# Patient Record
Sex: FEMALE | Race: BLACK OR AFRICAN AMERICAN | NOT HISPANIC OR LATINO | Employment: FULL TIME | URBAN - METROPOLITAN AREA
[De-identification: names, ages, dates, MRNs, and addresses within clinical notes are randomized per-mention and may not be internally consistent; named-entity substitution may affect disease eponyms.]

---

## 2017-06-21 ENCOUNTER — OFFICE VISIT (OUTPATIENT)
Dept: URGENT CARE | Facility: URGENT CARE | Age: 27
End: 2017-06-21
Payer: COMMERCIAL

## 2017-06-21 VITALS
BODY MASS INDEX: 37.51 KG/M2 | TEMPERATURE: 98.1 F | OXYGEN SATURATION: 98 % | HEIGHT: 67 IN | WEIGHT: 239 LBS | SYSTOLIC BLOOD PRESSURE: 112 MMHG | HEART RATE: 80 BPM | RESPIRATION RATE: 16 BRPM | DIASTOLIC BLOOD PRESSURE: 66 MMHG

## 2017-06-21 DIAGNOSIS — R53.81 MALAISE AND FATIGUE: Primary | ICD-10-CM

## 2017-06-21 DIAGNOSIS — R53.83 MALAISE AND FATIGUE: Primary | ICD-10-CM

## 2017-06-21 LAB
ANION GAP SERPL CALCULATED.3IONS-SCNC: 3 MMOL/L (ref 3–14)
BUN SERPL-MCNC: 11 MG/DL (ref 7–30)
CALCIUM SERPL-MCNC: 9.5 MG/DL (ref 8.5–10.1)
CHLORIDE SERPL-SCNC: 105 MMOL/L (ref 94–109)
CO2 SERPL-SCNC: 28 MMOL/L (ref 20–32)
CREAT SERPL-MCNC: 0.8 MG/DL (ref 0.52–1.04)
ERYTHROCYTE [DISTWIDTH] IN BLOOD BY AUTOMATED COUNT: 12.6 % (ref 10–15)
GFR SERPL CREATININE-BSD FRML MDRD: 85 ML/MIN/1.7M2
GLUCOSE SERPL-MCNC: 106 MG/DL (ref 70–99)
HCT VFR BLD AUTO: 41.1 % (ref 35–47)
HGB BLD-MCNC: 13.3 G/DL (ref 11.7–15.7)
MCH RBC QN AUTO: 30.6 PG (ref 26.5–33)
MCHC RBC AUTO-ENTMCNC: 32.4 G/DL (ref 31.5–36.5)
MCV RBC AUTO: 95 FL (ref 78–100)
PLATELET # BLD AUTO: 221 10E9/L (ref 150–450)
POTASSIUM SERPL-SCNC: 4.1 MMOL/L (ref 3.4–5.3)
RBC # BLD AUTO: 4.35 10E12/L (ref 3.8–5.2)
SODIUM SERPL-SCNC: 136 MMOL/L (ref 133–144)
WBC # BLD AUTO: 8.4 10E9/L (ref 4–11)

## 2017-06-21 PROCEDURE — 85027 COMPLETE CBC AUTOMATED: CPT | Performed by: HOSPITALIST

## 2017-06-21 PROCEDURE — 99214 OFFICE O/P EST MOD 30 MIN: CPT | Performed by: HOSPITALIST

## 2017-06-21 PROCEDURE — 93000 ELECTROCARDIOGRAM COMPLETE: CPT | Performed by: HOSPITALIST

## 2017-06-21 PROCEDURE — 36415 COLL VENOUS BLD VENIPUNCTURE: CPT | Performed by: HOSPITALIST

## 2017-06-21 PROCEDURE — 80048 BASIC METABOLIC PNL TOTAL CA: CPT | Performed by: HOSPITALIST

## 2017-06-21 NOTE — NURSING NOTE
"Chief Complaint   Patient presents with     Fatigue     tired, chest pain on and off x 2 months, was after her        Initial /66 (BP Location: Right arm, Patient Position: Sitting, Cuff Size: Adult Large)  Pulse 80  Temp 98.1  F (36.7  C) (Oral)  Resp 16  Ht 5' 7\" (1.702 m)  Wt 239 lb (108.4 kg)  LMP   SpO2 98%  Breastfeeding? No  BMI 37.43 kg/m2 Estimated body mass index is 37.43 kg/(m^2) as calculated from the following:    Height as of this encounter: 5' 7\" (1.702 m).    Weight as of this encounter: 239 lb (108.4 kg).  Medication Reconciliation: elkin Nguyen CMA      "

## 2017-06-21 NOTE — PROGRESS NOTES
"  Patient came here due to fatique and chest discomfort that has been going on and off for about 1 year. She has her c section for her first pregnancy last July 2016 apparently after that pt feel easily tired and also has chest discomfort on and off, due to that pt actually has echo last year and it only show mild pulmonary hypertension and also trace mitral regurgitation. She unfortunately has not ever seen any primary care, she also has Mirena placed about 6 weeks post partum for birth control. Her work is a  as per her it is not very taxing. 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     No Known Allergies    History reviewed. No pertinent past medical history.      No current outpatient prescriptions on file prior to visit.  No current facility-administered medications on file prior to visit.     Social History   Substance Use Topics     Smoking status: Never Smoker     Smokeless tobacco: Never Used     Alcohol use Yes       ROS:  Consitutional: As above  ENT: As above  Respiratory: As above    OBJECTIVE:  /66 (BP Location: Right arm, Patient Position: Sitting, Cuff Size: Adult Large)  Pulse 80  Temp 98.1  F (36.7  C) (Oral)  Resp 16  Ht 5' 7\" (1.702 m)  Wt 239 lb (108.4 kg)  LMP   SpO2 98%  Breastfeeding? No  BMI 37.43 kg/m2  GENERAL APPEARANCE: healthy, alert and mild distress  EYES: conjunctiva clear  EARS:no cerumen.   Ear canals w/o erythema, TM's intact w/o erythema.    NOSE/MOUTH: Nose and mouth without ulcers, erythema or lesions  THROAT: no erythema w/ no tonsillar enlargement . no exudates  NECK: supple, nontender, no lymphadenopathy, normal thyroid gland size  RESP: lungs clear to auscultation - no rales, rhonchi or wheezes  CV: regular rates and rhythm, normal S1 S2, no murmur noted  NEURO: awake, alert      EKG: normal sinus rhythm    Recent Results (from the past 168 hour(s))   CBC with platelets    Collection Time: 06/21/17  5:25 PM   Result Value Ref Range    WBC 8.4 4.0 - " 11.0 10e9/L    RBC Count 4.35 3.8 - 5.2 10e12/L    Hemoglobin 13.3 11.7 - 15.7 g/dL    Hematocrit 41.1 35.0 - 47.0 %    MCV 95 78 - 100 fl    MCH 30.6 26.5 - 33.0 pg    MCHC 32.4 31.5 - 36.5 g/dL    RDW 12.6 10.0 - 15.0 %    Platelet Count 221 150 - 450 10e9/L        ASSESSMENT:     ICD-10-CM    1. Malaise and fatigue R53.81 CBC with platelets    R53.83 Basic metabolic panel  (Ca, Cl, CO2, Creat, Gluc, K, Na, BUN)     EKG 12-lead complete w/read - Clinics         PLAN:    At this time i do stress to her the neccessitiy to establish to a primary care, I explain fatique can be very broad from cardiac to hormonal issue,    When I got the result of her EKG and bmp and cbc patient already left the clinic. her initial EKG was normal her cbc and bmp also normal. I do left message on her cell phone ([phone]) to notify her about the result     I do think she need to evaluate further on primary care clinic, she need at the very least a thyroid evaluation too. (this was discuss before patient left our office)     Patient understand and agreeable with this plan, all question answered.     Viji Walter MD

## 2017-06-21 NOTE — MR AVS SNAPSHOT
"              After Visit Summary   2017    Isabell Maza    MRN: [id number]           Patient Information     Date Of Birth          1990        Visit Information        Provider Department      2017 3:00 PM Viji Walter MD [facility]        Today's Diagnoses     Malaise and fatigue    -  1       Follow-ups after your visit        Who to contact     If you have questions or need follow up information about today's clinic visit or your schedule please contact [facility] directly at [phone].  Normal or non-critical lab and imaging results will be communicated to you by [facility], letter or phone within 4 business days after the clinic has received the results. If you do not hear from us within 7 days, please contact the clinic through [facility] or phone. If you have a critical or abnormal lab result, we will notify you by phone as soon as possible.  Submit refill requests through [employer] or call your pharmacy and they will forward the refill request to us. Please allow 3 business days for your refill to be completed.          Additional Information About Your Visit        MyChart Information     [employer] lets you send messages to your doctor, view your test results, renew your prescriptions, schedule appointments and more. To sign up, go to [email or web address] . Click on \"Log in\" on the left side of the screen, which will take you to the Welcome page. Then click on \"Sign up Now\" on the right side of the page.     You will be asked to enter the access code listed below, as well as some personal information. Please follow the directions to create your username and password.     Your access code is: [email or web address]  Expires: 2017  6:15 PM     Your access code will  in 90 days. If you need help or a new code, please call your [place] clinic or [phone].        Care EveryWhere ID     This is your Care EveryWhere ID. This could be used by other " "organizations to access your [facility] medical records  [id number]        Your Vitals Were     Pulse Temperature Respirations Height Pulse Oximetry       80 98.1  F (36.7  C) (Oral) 16 5' 7\" (1.702 m) 98%     Breastfeeding? BMI (Body Mass Index)                No 37.43 kg/m2           Blood Pressure from Last 3 Encounters:   06/21/17 112/66   02/20/13 100/74   03/23/10 112/82    Weight from Last 3 Encounters:   06/21/17 239 lb (108.4 kg)   08/28/07 183 lb 6.4 oz (83.2 kg) (95 %)*     * Growth percentiles are based on CDC 2-20 Years data.              We Performed the Following     Basic metabolic panel  (Ca, Cl, CO2, Creat, Gluc, K, Na, BUN)     CBC with platelets     EKG 12-lead complete w/read - Clinics        Primary Care Provider    None Specified       No primary provider on file.        Equal Access to Services     [facility]: Hadii wesley Conde, wajenna wells, karl kaalmada jama, ralph mensah . So [facility] [phone].    ATENCIÓN: Si habla español, tiene a carrizales disposición servicios gratuitos de asistencia lingüística. Steveame al [phone].    We comply with applicable federal civil rights laws and [place] laws. We do not discriminate on the basis of race, color, national origin, age, disability sex, sexual orientation or gender identity.            Thank you!     Thank you for choosing [facility]  for your care. Our goal is always to provide you with excellent care. Hearing back from our patients is one way we can continue to improve our services. Please take a few minutes to complete the written survey that you may receive in the mail after your visit with us. Thank you!             Your Updated Medication List - Protect others around you: Learn how to safely use, store and throw away your medicines at www.disposemymeds.org.      Notice  As of 6/21/2017  6:15 PM    You have not been prescribed any medications.      "

## 2017-07-27 ENCOUNTER — OFFICE VISIT (OUTPATIENT)
Dept: PEDIATRICS | Facility: CLINIC | Age: 27
End: 2017-07-27
Payer: COMMERCIAL

## 2017-07-27 VITALS
HEART RATE: 62 BPM | WEIGHT: 238.3 LBS | SYSTOLIC BLOOD PRESSURE: 104 MMHG | BODY MASS INDEX: 37.4 KG/M2 | OXYGEN SATURATION: 98 % | DIASTOLIC BLOOD PRESSURE: 62 MMHG | HEIGHT: 67 IN | TEMPERATURE: 97.9 F

## 2017-07-27 DIAGNOSIS — F41.9 ANXIETY: ICD-10-CM

## 2017-07-27 DIAGNOSIS — R53.83 OTHER FATIGUE: Primary | ICD-10-CM

## 2017-07-27 PROCEDURE — 99214 OFFICE O/P EST MOD 30 MIN: CPT | Performed by: NURSE PRACTITIONER

## 2017-07-27 ASSESSMENT — ANXIETY QUESTIONNAIRES
3. WORRYING TOO MUCH ABOUT DIFFERENT THINGS: NEARLY EVERY DAY
IF YOU CHECKED OFF ANY PROBLEMS ON THIS QUESTIONNAIRE, HOW DIFFICULT HAVE THESE PROBLEMS MADE IT FOR YOU TO DO YOUR WORK, TAKE CARE OF THINGS AT HOME, OR GET ALONG WITH OTHER PEOPLE: VERY DIFFICULT
5. BEING SO RESTLESS THAT IT IS HARD TO SIT STILL: SEVERAL DAYS
2. NOT BEING ABLE TO STOP OR CONTROL WORRYING: NEARLY EVERY DAY
GAD7 TOTAL SCORE: 14
6. BECOMING EASILY ANNOYED OR IRRITABLE: SEVERAL DAYS
1. FEELING NERVOUS, ANXIOUS, OR ON EDGE: MORE THAN HALF THE DAYS
7. FEELING AFRAID AS IF SOMETHING AWFUL MIGHT HAPPEN: MORE THAN HALF THE DAYS

## 2017-07-27 ASSESSMENT — PATIENT HEALTH QUESTIONNAIRE - PHQ9: 5. POOR APPETITE OR OVEREATING: MORE THAN HALF THE DAYS

## 2017-07-27 NOTE — NURSING NOTE
"Chief Complaint   Patient presents with     RECHECK     UC F/U       Initial /62 (Cuff Size: Adult Large)  Pulse 62  Temp 97.9  F (36.6  C) (Tympanic)  Ht 5' 7\" (1.702 m)  Wt 238 lb 4.8 oz (108.1 kg)  SpO2 98%  BMI 37.32 kg/m2 Estimated body mass index is 37.32 kg/(m^2) as calculated from the following:    Height as of this encounter: 5' 7\" (1.702 m).    Weight as of this encounter: 238 lb 4.8 oz (108.1 kg).  Medication Reconciliation: complete   Bobbi Ugalde CMA    "

## 2017-07-27 NOTE — PATIENT INSTRUCTIONS
-Decrease caffeine somewhat  -Start Prozac 20mg daily  -See therapist  -Sleep study [facility] [place]  Ph [phone] (Age 18 and up)

## 2017-07-27 NOTE — MR AVS SNAPSHOT
After Visit Summary   7/27/2017    Isabell Maza    MRN: [id number]           Patient Information     Date Of Birth          1990        Visit Information        Provider Department      7/27/2017 1:20 PM Raven Porter APRN [facility]        Today's Diagnoses     Other fatigue    -  1    Anxiety          Care Instructions    -Decrease caffeine somewhat  -Start Prozac 20mg daily  -See therapist  -Sleep study [facility] [phone] (Age 18 and up)            Follow-ups after your visit        Additional Services     MENTAL HEALTH REFERRAL       Your provider has referred you to: [facility]: [facility] - Counseling (Individual/Couples/Family) - [facility] [place] [phone]   *Patient will be contacted by [facility]'s scheduling partner, [facility]), to schedule an appointment.  Patients may also call [facility] to schedule.    All scheduling is subject to the client's specific insurance plan & benefits, provider/location availability, and provider clinical specialities.  Please arrive 15 minutes early for your first appointment and bring your completed paperwork.    Please be aware that coverage of these services is subject to the terms and limitations of your health insurance plan.  Call member services at your health plan with any benefit or coverage questions.            SLEEP EVALUATION & MANAGEMENT REFERRAL - ADULT       Please be aware that coverage of these services is subject to the terms and limitations of your health insurance plan.  Call member services at your health plan with any benefit or coverage questions.      Please bring the following to your appointment:    >>   List of current medications   >>   This referral request   >>   Any documents/labs given to you for this referral    [facility] [phone] (Age 18 and up)                  Future tests that were  "ordered for you today     Open Future Orders        Priority Expected Expires Ordered    SLEEP EVALUATION & MANAGEMENT REFERRAL - ADULT Routine  2018            Who to contact     If you have questions or need follow up information about today's clinic visit or your schedule please contact [facility] [place] directly at [phone].  Normal or non-critical lab and imaging results will be communicated to you by MyChart, letter or phone within 4 business days after the clinic has received the results. If you do not hear from us within 7 days, please contact the clinic through [employer] or phone. If you have a critical or abnormal lab result, we will notify you by phone as soon as possible.  Submit refill requests through [employer] or call your pharmacy and they will forward the refill request to us. Please allow 3 business days for your refill to be completed.          Additional Information About Your Visit        [employer] Information     [employer] lets you send messages to your doctor, view your test results, renew your prescriptions, schedule appointments and more. To sign up, go to [email or web address] . Click on \"Log in\" on the left side of the screen, which will take you to the Welcome page. Then click on \"Sign up Now\" on the right side of the page.     You will be asked to enter the access code listed below, as well as some personal information. Please follow the directions to create your username and password.     Your access code is: [email or web address]  Expires: 2017  6:15 PM     Your access code will  in 90 days. If you need help or a new code, please call your [place] clinic or [phone].        Care EveryWhere ID     This is your Care EveryWhere ID. This could be used by other organizations to access your [facility] medical records  [id number]        Your Vitals Were     Pulse Temperature Height Pulse Oximetry BMI (Body Mass Index)       62 97.9  F (36.6  C) (Tympanic) 5' 7\" (1.702 " m) 98% 37.32 kg/m2        Blood Pressure from Last 3 Encounters:   07/27/17 104/62   06/21/17 112/66   02/20/13 100/74    Weight from Last 3 Encounters:   07/27/17 238 lb 4.8 oz (108.1 kg)   06/21/17 239 lb (108.4 kg)   08/28/07 183 lb 6.4 oz (83.2 kg) (95 %)*     * Growth percentiles are based on [facility] 2-20 Years data.              We Performed the Following     MENTAL HEALTH REFERRAL     TSH with free T4 reflex     Vitamin D Deficiency          Today's Medication Changes          These changes are accurate as of: 7/27/17  2:02 PM.  If you have any questions, ask your nurse or doctor.               Start taking these medicines.        Dose/Directions    FLUoxetine 20 MG capsule   Commonly known as:  PROzac   Used for:  Anxiety   Started by:  Raven Porter APRN CNP        Dose:  20 mg   Take 1 capsule (20 mg) by mouth daily   Quantity:  30 capsule   Refills:  1            Where to get your medicines      These medications were sent to [id number] - [place], [place] - [address]  AT [address]  [address] [place] [address]     Phone:  [phone]     FLUoxetine 20 MG capsule                Primary Care Provider    None Specified       No primary provider on file.        Equal Access to Services     [facility] AH: Sebastian vierao Soamy, waaxda luqadaha, qaybta kaalmada adeegyada, ralph fernandez. So [facility] [phone].    ATENCIÓN: Si habla español, tiene a carrizales disposición servicios gratuitos de asistencia lingüística. Bacilio al [phone].    We comply with applicable federal civil rights laws and [place] laws. We do not discriminate on the basis of race, color, national origin, age, disability sex, sexual orientation or gender identity.            Thank you!     Thank you for choosing [facility]  for your care. Our goal is always to provide you with excellent care. Hearing back from our patients is one way we can  continue to improve our services. Please take a few minutes to complete the written survey that you may receive in the mail after your visit with us. Thank you!             Your Updated Medication List - Protect others around you: Learn how to safely use, store and throw away your medicines at www.disposemymeds.org.          This list is accurate as of: 7/27/17  2:02 PM.  Always use your most recent med list.                   Brand Name Dispense Instructions for use Diagnosis    FLUoxetine 20 MG capsule    PROzac    30 capsule    Take 1 capsule (20 mg) by mouth daily    Anxiety
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
## 2017-07-28 ASSESSMENT — ANXIETY QUESTIONNAIRES: GAD7 TOTAL SCORE: 14

## 2017-07-28 ASSESSMENT — PATIENT HEALTH QUESTIONNAIRE - PHQ9: SUM OF ALL RESPONSES TO PHQ QUESTIONS 1-9: 10

## 2017-08-29 ENCOUNTER — HOSPITAL ENCOUNTER (EMERGENCY)
Facility: CLINIC | Age: 27
Discharge: HOME | End: 2017-08-29
Attending: EMERGENCY MEDICINE | Admitting: EMERGENCY MEDICINE
Payer: COMMERCIAL

## 2017-08-29 VITALS
OXYGEN SATURATION: 96 % | SYSTOLIC BLOOD PRESSURE: 126 MMHG | HEART RATE: 87 BPM | HEIGHT: 67 IN | RESPIRATION RATE: 18 BRPM | DIASTOLIC BLOOD PRESSURE: 84 MMHG | BODY MASS INDEX: 37.04 KG/M2 | TEMPERATURE: 97.8 F | WEIGHT: 236 LBS

## 2017-08-29 DIAGNOSIS — R55 VASOVAGAL SYNCOPE: ICD-10-CM

## 2017-08-29 DIAGNOSIS — R55 NEAR SYNCOPE: ICD-10-CM

## 2017-08-29 LAB
ANION GAP SERPL CALCULATED.3IONS-SCNC: 8 MMOL/L (ref 3–14)
BASOPHILS # BLD AUTO: 0 10E9/L (ref 0–0.2)
BASOPHILS NFR BLD AUTO: 0.3 %
BUN SERPL-MCNC: 17 MG/DL (ref 7–30)
CALCIUM SERPL-MCNC: 8.5 MG/DL (ref 8.5–10.1)
CHLORIDE SERPL-SCNC: 107 MMOL/L (ref 94–109)
CO2 SERPL-SCNC: 23 MMOL/L (ref 20–32)
CREAT SERPL-MCNC: 0.8 MG/DL (ref 0.52–1.04)
DIFFERENTIAL METHOD BLD: ABNORMAL
EOSINOPHIL # BLD AUTO: 0.1 10E9/L (ref 0–0.7)
EOSINOPHIL NFR BLD AUTO: 0.5 %
ERYTHROCYTE [DISTWIDTH] IN BLOOD BY AUTOMATED COUNT: 12.4 % (ref 10–15)
GFR SERPL CREATININE-BSD FRML MDRD: 85 ML/MIN/1.7M2
GLUCOSE SERPL-MCNC: 87 MG/DL (ref 70–99)
HCG UR QL: NEGATIVE
HCT VFR BLD AUTO: 38.8 % (ref 35–47)
HGB BLD-MCNC: 12.6 G/DL (ref 11.7–15.7)
IMM GRANULOCYTES # BLD: 0.1 10E9/L (ref 0–0.4)
IMM GRANULOCYTES NFR BLD: 0.4 %
LYMPHOCYTES # BLD AUTO: 1.7 10E9/L (ref 0.8–5.3)
LYMPHOCYTES NFR BLD AUTO: 14.5 %
MCH RBC QN AUTO: 30.2 PG (ref 26.5–33)
MCHC RBC AUTO-ENTMCNC: 32.5 G/DL (ref 31.5–36.5)
MCV RBC AUTO: 93 FL (ref 78–100)
MONOCYTES # BLD AUTO: 0.8 10E9/L (ref 0–1.3)
MONOCYTES NFR BLD AUTO: 6.3 %
NEUTROPHILS # BLD AUTO: 9.3 10E9/L (ref 1.6–8.3)
NEUTROPHILS NFR BLD AUTO: 78 %
NRBC # BLD AUTO: 0 10*3/UL
NRBC BLD AUTO-RTO: 0 /100
PLATELET # BLD AUTO: 223 10E9/L (ref 150–450)
POTASSIUM SERPL-SCNC: 3.8 MMOL/L (ref 3.4–5.3)
RBC # BLD AUTO: 4.17 10E12/L (ref 3.8–5.2)
SODIUM SERPL-SCNC: 138 MMOL/L (ref 133–144)
TROPONIN I SERPL-MCNC: <0.015 UG/L (ref 0–0.04)
WBC # BLD AUTO: 12 10E9/L (ref 4–11)

## 2017-08-29 PROCEDURE — 93005 ELECTROCARDIOGRAM TRACING: CPT

## 2017-08-29 PROCEDURE — 85025 COMPLETE CBC W/AUTO DIFF WBC: CPT | Performed by: EMERGENCY MEDICINE

## 2017-08-29 PROCEDURE — 96361 HYDRATE IV INFUSION ADD-ON: CPT

## 2017-08-29 PROCEDURE — 84484 ASSAY OF TROPONIN QUANT: CPT | Performed by: EMERGENCY MEDICINE

## 2017-08-29 PROCEDURE — 36415 COLL VENOUS BLD VENIPUNCTURE: CPT | Performed by: EMERGENCY MEDICINE

## 2017-08-29 PROCEDURE — 96360 HYDRATION IV INFUSION INIT: CPT

## 2017-08-29 PROCEDURE — 25000128 H RX IP 250 OP 636: Performed by: EMERGENCY MEDICINE

## 2017-08-29 PROCEDURE — 80048 BASIC METABOLIC PNL TOTAL CA: CPT | Performed by: EMERGENCY MEDICINE

## 2017-08-29 PROCEDURE — 81025 URINE PREGNANCY TEST: CPT | Performed by: EMERGENCY MEDICINE

## 2017-08-29 PROCEDURE — 99284 EMERGENCY DEPT VISIT MOD MDM: CPT | Mod: 25

## 2017-08-29 RX ADMIN — SODIUM CHLORIDE 1000 ML: 9 INJECTION, SOLUTION INTRAVENOUS at 19:45

## 2017-08-29 ASSESSMENT — ENCOUNTER SYMPTOMS
LIGHT-HEADEDNESS: 1
DIAPHORESIS: 1
SHORTNESS OF BREATH: 1

## 2017-08-29 NOTE — ED AVS SNAPSHOT
[facility] Emergency Department    [address] [place] [address]    Phone:  [phone]    Fax:  [phone]                                       Isabell Maza   MRN: [id number]    Department:  [facility] Emergency Department   Date of Visit:  8/29/2017           Patient Information     Date Of Birth          1990        Your diagnoses for this visit were:     Near syncope     Vasovagal syncope        You were seen by Katalina Peck MD.      Follow-up Information     Follow up with Teddy Iraheta. Schedule an appointment as soon as possible for a visit in 1 day.        Discharge Instructions       Please return to the emergency department if you notice any lightheadedness, shortness of breath, chest pain, numbness or tingling, difficulty walking, severe headache or other acute changes.  Please follow-up with your primary care doctor in the next 1-2 days.    Discharge Instructions  Syncope    Syncope (fainting) is a sudden, short loss of consciousness (passing out spell). People will usually fall to the ground when they faint or slump over if seated.  People may also shake when this happens, and it can sometimes be difficult to tell the difference between syncope and a seizure. At this time, your provider does not find a reason to suspect that your fainting spell is a sign of anything dangerous or life-threatening.  However, sometimes the signs of serious illness do not show up right away.     Generally, every Emergency Department visit should have a follow-up clinic visit with either a primary or a specialty clinic/provider. Please follow-up as instructed by your emergency provider today.    Return to the Emergency Department if:    You faint again.     You have any significant bleeding.    You have chest pain or a fast or irregular heartbeat.    You feel short of breath.    You cough up any blood.    You have abdominal (belly) pain or unusual  back pain.    You have ongoing vomiting (throwing up) or diarrhea (loose stools).    You have a black or tarry bowel movement, or blood in the stool or in your vomit.    You have a fever over 101 F.    You lose feeling or cannot move a part of your body or cannot talk normally.    You are confused, have a headache, cannot see well, or have a seizure.    DO NOT DRIVE. CALL 911 INSTEAD!    What can I do to help myself?    Follow any specific instructions that your provider discussed with you.    If you feel light-headed, make sure to sit down right away, even if you have to sit on the floor.    Follow up with your regular medical provider as discussed for further management. This may include lowering your blood pressure medications, insulin or other diabetic medications, checking your blood sugar more frequently, and drinking more fluids, taking medicines for vomiting or diarrhea or getting up slower.  If you were given a prescription for medicine here today, be sure to read all of the information (including the package insert) that comes with your prescription.  This will include important information about the medicine, its side effects, and any warnings that you need to know about.  The pharmacist who fills the prescription can provide more information and answer questions you may have about the medicine.  If you have questions or concerns that the pharmacist cannot address, please call or return to the Emergency Department.   Remember that you can always come back to the Emergency Department if you are not able to see your regular provider in the amount of time listed above, if you get any new symptoms, or if there is anything that worries you.      Near-Fainting: Vagal Reaction  Fainting (syncope) is a temporary loss of consciousness (passing out). It is associated with a loss of postural tone. Postural tone is the constant contraction of the muscles in your body to help keep your body upright. It also helps  "blood return towards the heart and brain. Syncope occurs when there is reduced blood flow to the brain due to this common vagal reaction. A vagal reaction is a reflex response that causes a sudden drop in your blood pressure, and your pulse to slow down. If the pulse is low enough, the blood pressure falls and causes fainting or near-fainting. Lying down usually stops the reaction very quickly.  These are symptoms of near-fainting:    Feeling lightheaded or like you are going to faint    Weak pulse    Nausea    Sweating    Blurred vision or feeling like your vision is \"blacking out\"    Palpitations    Chest pain    Trouble breathing    Cool and clammy skin  Causes for near-fainting include:    Sudden emotional stress like fear, pain, panic, sight of blood    Straining or overexertion, straining while using the toilet, coughing, sneezing    Standing up too quickly, or standing up for too long a time    Pregnancy  Home care  The following will help you care for yourself at home:    Rest today and go back to your normal activities as soon as you are feeling back to normal.    If you become light-headed or dizzy, lie down right away or sit with your head lowered between your knees.    Stay hydrated and do not skip meals.    Avoid prolonged standing and hot places    Do what you can to prevent constipation. If you bear down excessively when trying to have a bowel movement, this can trigger a vagal response  There may be other causes for a vagal response and near-syncope. For example, this can happen after open-heart surgery when the heart muscle is inflamed and irritated.  Check with your doctor to see if there is testing you need such as a tilt-table test, heart rhythm monitoring, or blood tests. Review the medicines you take with your healthcare provider and pharmacist to be sure the symptoms you have are not a side effect of a medicine.  Follow-up care  Follow up with your healthcare provider, or as advised.   If you " are having frequent episodes of near-syncope or vagal reactions, be cautious about activities such as driving that could harm yourself or others if you were to faint. Do not drive or operate heavy machinery if you are feeling like you may faint.  Call 911  Call 911 if any of these occur:    Another fainting spell occurs, and it is not explained by the common causes listed above    Fainting or loss of consciousness    Chest, arm, neck, jaw, back, or abdominal pain    Shortness of breath    Weakness, tingling, or numbness in one side of the face, one arm or leg    Slurred speech, confusion, trouble walking or seeing    Seizure    Blood in vomit or stools (black or red color)  When to seek medical advice  Call your healthcare provider right away if you have occasional mild lightheadedness, especially when standing up.  Date Last Reviewed: 6/1/2016 2000-2017 [employer]. [address] [place] [address]. All rights reserved. This information is not intended as a substitute for professional medical care. Always follow your healthcare professional's instructions.          24 Hour Appointment Hotline       To make an appointment at any [facility], call [phone] ([phone]). If you don't have a family doctor or clinic, we will help you find one. [facility] clinics are conveniently located to serve the needs of you and your family.             Review of your medicines      Our records show that you are taking the medicines listed below. If these are incorrect, please call your family doctor or clinic.        Dose / Directions Last dose taken    FLUoxetine 20 MG capsule   Commonly known as:  PROzac   Dose:  20 mg   Quantity:  30 capsule        Take 1 capsule (20 mg) by mouth daily   Refills:  1                Procedures and tests performed during your visit     Basic metabolic panel    CBC with platelets differential    EKG 12-lead, tracing only    HCG qualitative urine    Troponin I       Orders Needing Specimen Collection     None      Pending Results     Date and Time Order Name Status Description    8/29/2017 1925 EKG 12-lead, tracing only Preliminary             Pending Culture Results     No orders found from 8/27/2017 to 8/30/2017.            Pending Results Instructions     If you had any lab results that were not finalized at the time of your Discharge, you can call the ED Lab Result RN at [phone]. You will be contacted by this team for any positive Lab results or changes in treatment. The nurses are available 7 days a week from 10A to 6:30P.  You can leave a message 24 hours per day and they will return your call.        Test Results From Your Hospital Stay        8/29/2017  8:29 PM      Component Results     Component Value Ref Range & Units Status    HCG Qual Urine Negative NEG^Negative Final    This test is for screening purposes.  Results should be interpreted along with   the clinical picture.  Confirmation testing is available if warranted by   ordering [id number], HCG Quantitative Pregnancy.           8/29/2017  8:52 PM      Component Results     Component Value Ref Range & Units Status    Sodium 138 133 - 144 mmol/L Final    Potassium 3.8 3.4 - 5.3 mmol/L Final    Chloride 107 94 - 109 mmol/L Final    Carbon Dioxide 23 20 - 32 mmol/L Final    Anion Gap 8 3 - 14 mmol/L Final    Glucose 87 70 - 99 mg/dL Final    Urea Nitrogen 17 7 - 30 mg/dL Final    Creatinine 0.80 0.52 - 1.04 mg/dL Final    GFR Estimate 85 >60 mL/min/1.7m2 Final    Non  GFR Calc    GFR Estimate If Black >90 >60 mL/min/1.7m2 Final    African American GFR Calc    Calcium 8.5 8.5 - 10.1 mg/dL Final         8/29/2017  8:34 PM      Component Results     Component Value Ref Range & Units Status    WBC 12.0 (H) 4.0 - 11.0 10e9/L Final    RBC Count 4.17 3.8 - 5.2 10e12/L Final    Hemoglobin 12.6 11.7 - 15.7 g/dL Final    Hematocrit 38.8 35.0 - 47.0 % Final    MCV 93 78 - 100 fl Final    MCH 30.2 26.5 -  33.0 pg Final    MCHC 32.5 31.5 - 36.5 g/dL Final    RDW 12.4 10.0 - 15.0 % Final    Platelet Count 223 150 - 450 10e9/L Final    Diff Method Automated Method  Final    % Neutrophils 78.0 % Final    % Lymphocytes 14.5 % Final    % Monocytes 6.3 % Final    % Eosinophils 0.5 % Final    % Basophils 0.3 % Final    % Immature Granulocytes 0.4 % Final    Nucleated RBCs 0 0 /100 Final    Absolute Neutrophil 9.3 (H) 1.6 - 8.3 10e9/L Final    Absolute Lymphocytes 1.7 0.8 - 5.3 10e9/L Final    Absolute Monocytes 0.8 0.0 - 1.3 10e9/L Final    Absolute Eosinophils 0.1 0.0 - 0.7 10e9/L Final    Absolute Basophils 0.0 0.0 - 0.2 10e9/L Final    Abs Immature Granulocytes 0.1 0 - 0.4 10e9/L Final    Absolute Nucleated RBC 0.0  Final         8/29/2017  8:52 PM      Component Results     Component Value Ref Range & Units Status    Troponin I ES <0.015 0.000 - 0.045 ug/L Final    The 99th percentile for upper reference range is 0.045 ug/L.  Troponin values   in the range of 0.045 - 0.120 ug/L may be associated with risks of adverse   clinical events.                  Clinical Quality Measure: Blood Pressure Screening     Your blood pressure was checked while you were in the emergency department today. The last reading we obtained was  BP: 126/84 . Please read the guidelines below about what these numbers mean and what you should do about them.  If your systolic blood pressure (the top number) is less than 120 and your diastolic blood pressure (the bottom number) is less than 80, then your blood pressure is normal. There is nothing more that you need to do about it.  If your systolic blood pressure (the top number) is 120-139 or your diastolic blood pressure (the bottom number) is 80-89, your blood pressure may be higher than it should be. You should have your blood pressure rechecked within a year by a primary care provider.  If your systolic blood pressure (the top number) is 140 or greater or your diastolic blood pressure (the bottom  "number) is 90 or greater, you may have high blood pressure. High blood pressure is treatable, but if left untreated over time it can put you at risk for heart attack, stroke, or kidney failure. You should have your blood pressure rechecked by a primary care provider within the next 4 weeks.  If your provider in the emergency department today gave you specific instructions to follow-up with your doctor or provider even sooner than that, you should follow that instruction and not wait for up to 4 weeks for your follow-up visit.        Thank you for choosing [facility]       Thank you for choosing [facility] for your care. Our goal is always to provide you with excellent care. Hearing back from our patients is one way we can continue to improve our services. Please take a few minutes to complete the written survey that you may receive in the mail after you visit with us. Thank you!        [facility] Information     [facility] lets you send messages to your doctor, view your test results, renew your prescriptions, schedule appointments and more. To sign up, go to [email or web address] . Click on \"Log in\" on the left side of the screen, which will take you to the Welcome page. Then click on \"Sign up Now\" on the right side of the page.     You will be asked to enter the access code listed below, as well as some personal information. Please follow the directions to create your username and password.     Your access code is: [email or web address]  Expires: 2017  6:15 PM     Your access code will  in 90 days. If you need help or a new code, please call your [facility] clinic or [phone].        Care EveryWhere ID     This is your Care EveryWhere ID. This could be used by other organizations to access your [facility] medical records  [id number]        Equal Access to Services     CHINA NAVARRO : dariel Perez qaybta kaalmada adeegyada, waxay idiin hayaan adeeg kharash la'aan ah. So waedel " [phone].    ATENCIÓN: Si habla español, tiene a carrizales disposición servicios gratuitos de asistencia lingüística. Llame al [phone].    We comply with applicable federal civil rights laws and [place] laws. We do not discriminate on the basis of race, color, national origin, age, disability sex, sexual orientation or gender identity.            After Visit Summary       This is your record. Keep this with you and show to your community pharmacist(s) and doctor(s) at your next visit.

## 2017-08-29 NOTE — ED AVS SNAPSHOT
[facility] Emergency Department    [address]    [facility] [address]    Phone:  [phone]    Fax:  [phone]                                       Isabell Maza   MRN: [id number]    Department:  [facility] Emergency Department   Date of Visit:  8/29/2017           After Visit Summary Signature Page     I have received my discharge instructions, and my questions have been answered. I have discussed any challenges I see with this plan with the nurse or doctor.    ..........................................................................................................................................  Patient/Patient Representative Signature      ..........................................................................................................................................  Patient Representative Print Name and Relationship to Patient    ..................................................               ................................................  Date                                            Time    ..........................................................................................................................................  Reviewed by Signature/Title    ...................................................              ..............................................  Date                                                            Time

## 2017-08-30 LAB — INTERPRETATION ECG - MUSE: NORMAL

## 2017-08-30 NOTE — ED PROVIDER NOTES
History     Chief Complaint:  Chest pain    HPI   Isabell Maza is a normally healthy 27 year old female with a history of anxiety who presents to the emergency department via EMS with her mother for evaluation of chest pain. Earlier today, the patient was running errands when she experienced the onset of strong chest pain along with associated diaphoresis, lightheadedness, nausea, and shortness of breath. This prompted the patient to call EMS to seek evaluation here in the emergency department. In route, EMS gave the patient Ativan 0.5 mg IV x2. Here, the patient reports an improvement in her symptoms and now rates her chest pain as 2 out of 10. The patient denies vomiting, diarrhea, vaginal discharge, bleeding. The patient does report having a headache earlier today for which should took 4 ibuprofen at lunch time, but notes this has since resolved. Of note, the patient reports a recent UTI four weeks prior that has since resolved.    Cardiac/PE/DVT Risk Factors:  The patient denies history of hypertension, hyperlipidemia, diabetes, and smoking. She reports a family history of DVT and heart disease. The patient reports denies recent travel, surgery, or other immobilizations.     Allergies:  NDKA     Medications:    Prozac    Past Medical History:    Anxiety  Vasovagal syncope     Past Surgical History:    The patient does not have any pertinent past surgical history.    Family History:    Heart Disease  CVA  DVT    Social History:  Negative for tobacco use.  Positive for alcohol use  Patient presents with her mother  Marital Status:  Single [1]     Review of Systems   Constitutional: Positive for diaphoresis.   Respiratory: Positive for shortness of breath.    Cardiovascular: Positive for chest pain.   Neurological: Positive for light-headedness.   All other systems reviewed and are negative.      Physical Exam   First Vitals:  BP: 128/82  Pulse: 87  Temp: 97.8  F (36.6  C)  Resp: 18  Height: 170.2 cm (5'  "7\")  Weight: 107 kg (236 lb)  SpO2: 98 %    Physical Exam  Physical Exam   General: Resting on the bed.  Head: No obvious trauma to head.  Ears, Nose, Throat:  External ears normal.  Nose normal.    Eyes:  Conjunctivae and EOM are normal.  Pupils are equal, round, and reactive.   Neck: Normal range of motion.  Neck supple.   CV: Regular rate and rhythm.  No murmurs.      Respiratory: Effort normal and breath sounds normal.  No wheezing or crackles.   Gastrointestinal: Soft.  No distension. There is no tenderness.    Musculoskeletal: Normal range of motion.   Neuro: Alert. Moving all extremities appropriately.  Normal speech.  5/5 upper and lower extremity strength, no pronator drift.  Normal finger to nose.   Skin: Skin is warm and dry.  No rash noted.     Emergency Department Course   ECG:  Indication: chest pain  Time: 1938  Vent. Rate 81 bpm. AL interval 150. QRS duration 70. QT/QTc 398/462. P-R-T axis 26 14 37. Normal sinus rhythm. Normal ECG. Read time: 1952.    Laboratory:  CBC: WBC: 12.0 (H), HGB: 12.6, PLT: 223  BMP:  All WNL (Creatinine: 0.80)  2025 Troponin: <0.015  HCG qualitative: Negative    Interventions:  1945 NS 1L IV    Emergency Department Course:  1906 Nursing notes and vitals reviewed.  I performed an exam of the patient as documented above.     IV inserted. Medicine administered as documented above. Blood drawn. This was sent to the lab for further testing, results above.    The patient provided a urine sample here in the emergency department. This was sent for laboratory testing, findings above.     EKG obtained in the ED, see results above.     2045 I rechecked the patient and discussed the results of her workup thus far.     The patient was ambulated here in the emergency department without difficulty.     Findings and plan explained to the Patient. Patient discharged home with instructions regarding supportive care, medications, and reasons to return. The importance of close follow-up was " reviewed.     I personally reviewed the laboratory results with the Patient and answered all related questions prior to discharge.     Impression & Plan      Medical Decision Making:  Isabell Maza is a 27 year old female with a history of anxiety presenting with chest pain, lightheadedness, diaphoresis. Vitals reviewed unremarkable. Broad differential pursued including, but not limited to, electrolyte/metabolic abnormality, infection, ACS, arrythmia, PE, anemia, pregnancy, etc. Patient has a non focal neurological exam. At this time I do not suspect stroke or other acute intracranial pathology. Low concern for PE as patient is PERC score negative, Wells score zero, overall very low suspicion of PE and therefore differ further workup. ECG was reviewed and shows sinus rhythm, normal intervals, no evidence of WPW, brugada or wellens criteria.. No ischemia changes. When reviewed from prior, it appears grossly unchanged. Troponin negative. CBC with mild leukocytosis of 12.0, I suspect this is demargination in the setting of no fever and otherwise unremarkable exam and history. Denies any infectious signs or symptoms.  BMP without any acute electrolyte or metabolic abnormalities and normal renal function. Overall, unclear etiology of near syncope. Suspect with preceding lightheadedness, nausea, diaphoresis, vision narrowing that patient likely had a vasovagal event.  She was able to ambulate without difficulty in the ED.  She feels back to normal.  Discussed further evaluation versus out patient management and patient would prefer to manage her symptoms as an outpatient.  She was discharged with her family to home.  Advised close follow up.  Warning signs/symptoms discussed and patient voiced understanding.      Diagnosis:    ICD-10-CM    1. Near syncope R55    2. Vasovagal syncope R55        Disposition:  discharged to home    I, Harriet Riley, am serving as a scribe on 8/29/2017 at 7:06 PM to personally document  services performed by Katalina Peck MD based on my observations and the provider's statements to me.     Harriet Osvaldo  8/29/2017   [facility] EMERGENCY DEPARTMENT       Katalina Peck MD  08/29/17 [id number]

## 2017-08-30 NOTE — DISCHARGE INSTRUCTIONS
Please return to the emergency department if you notice any lightheadedness, shortness of breath, chest pain, numbness or tingling, difficulty walking, severe headache or other acute changes.  Please follow-up with your primary care doctor in the next 1-2 days.    Discharge Instructions  Syncope    Syncope (fainting) is a sudden, short loss of consciousness (passing out spell). People will usually fall to the ground when they faint or slump over if seated.  People may also shake when this happens, and it can sometimes be difficult to tell the difference between syncope and a seizure. At this time, your provider does not find a reason to suspect that your fainting spell is a sign of anything dangerous or life-threatening.  However, sometimes the signs of serious illness do not show up right away.     Generally, every Emergency Department visit should have a follow-up clinic visit with either a primary or a specialty clinic/provider. Please follow-up as instructed by your emergency provider today.    Return to the Emergency Department if:    You faint again.     You have any significant bleeding.    You have chest pain or a fast or irregular heartbeat.    You feel short of breath.    You cough up any blood.    You have abdominal (belly) pain or unusual back pain.    You have ongoing vomiting (throwing up) or diarrhea (loose stools).    You have a black or tarry bowel movement, or blood in the stool or in your vomit.    You have a fever over 101 F.    You lose feeling or cannot move a part of your body or cannot talk normally.    You are confused, have a headache, cannot see well, or have a seizure.    DO NOT DRIVE. CALL 911 INSTEAD!    What can I do to help myself?    Follow any specific instructions that your provider discussed with you.    If you feel light-headed, make sure to sit down right away, even if you have to sit on the floor.    Follow up with your regular medical provider as discussed for further  "management. This may include lowering your blood pressure medications, insulin or other diabetic medications, checking your blood sugar more frequently, and drinking more fluids, taking medicines for vomiting or diarrhea or getting up slower.  If you were given a prescription for medicine here today, be sure to read all of the information (including the package insert) that comes with your prescription.  This will include important information about the medicine, its side effects, and any warnings that you need to know about.  The pharmacist who fills the prescription can provide more information and answer questions you may have about the medicine.  If you have questions or concerns that the pharmacist cannot address, please call or return to the Emergency Department.   Remember that you can always come back to the Emergency Department if you are not able to see your regular provider in the amount of time listed above, if you get any new symptoms, or if there is anything that worries you.      Near-Fainting: Vagal Reaction  Fainting (syncope) is a temporary loss of consciousness (passing out). It is associated with a loss of postural tone. Postural tone is the constant contraction of the muscles in your body to help keep your body upright. It also helps blood return towards the heart and brain. Syncope occurs when there is reduced blood flow to the brain due to this common vagal reaction. A vagal reaction is a reflex response that causes a sudden drop in your blood pressure, and your pulse to slow down. If the pulse is low enough, the blood pressure falls and causes fainting or near-fainting. Lying down usually stops the reaction very quickly.  These are symptoms of near-fainting:    Feeling lightheaded or like you are going to faint    Weak pulse    Nausea    Sweating    Blurred vision or feeling like your vision is \"blacking out\"    Palpitations    Chest pain    Trouble breathing    Cool and clammy skin  Causes " for near-fainting include:    Sudden emotional stress like fear, pain, panic, sight of blood    Straining or overexertion, straining while using the toilet, coughing, sneezing    Standing up too quickly, or standing up for too long a time    Pregnancy  Home care  The following will help you care for yourself at home:    Rest today and go back to your normal activities as soon as you are feeling back to normal.    If you become light-headed or dizzy, lie down right away or sit with your head lowered between your knees.    Stay hydrated and do not skip meals.    Avoid prolonged standing and hot places    Do what you can to prevent constipation. If you bear down excessively when trying to have a bowel movement, this can trigger a vagal response  There may be other causes for a vagal response and near-syncope. For example, this can happen after open-heart surgery when the heart muscle is inflamed and irritated.  Check with your doctor to see if there is testing you need such as a tilt-table test, heart rhythm monitoring, or blood tests. Review the medicines you take with your healthcare provider and pharmacist to be sure the symptoms you have are not a side effect of a medicine.  Follow-up care  Follow up with your healthcare provider, or as advised.   If you are having frequent episodes of near-syncope or vagal reactions, be cautious about activities such as driving that could harm yourself or others if you were to faint. Do not drive or operate heavy machinery if you are feeling like you may faint.  Call 911  Call 911 if any of these occur:    Another fainting spell occurs, and it is not explained by the common causes listed above    Fainting or loss of consciousness    Chest, arm, neck, jaw, back, or abdominal pain    Shortness of breath    Weakness, tingling, or numbness in one side of the face, one arm or leg    Slurred speech, confusion, trouble walking or seeing    Seizure    Blood in vomit or stools (black or  red color)  When to seek medical advice  Call your healthcare provider right away if you have occasional mild lightheadedness, especially when standing up.  Date Last Reviewed: 6/1/2016 2000-2017 [employer]. [address] [place] [address], [place] [address]. All rights reserved. This information is not intended as a substitute for professional medical care. Always follow your healthcare professional's instructions.

## 2017-09-21 DIAGNOSIS — F41.9 ANXIETY: ICD-10-CM

## 2017-09-21 NOTE — TELEPHONE ENCOUNTER
FLUoxetine (PROZAC) 20 MG capsule  Last Written Prescription Date: 7/27/17  Last Fill Quantity: 30, # refills: 1  Last Office Visit with G primary care provider:  7/27/17        Last PHQ-9 score on record=   PHQ-9 SCORE 7/27/2017   Total Score 10

## 2017-10-09 ENCOUNTER — TELEPHONE (OUTPATIENT)
Dept: PEDIATRICS | Facility: CLINIC | Age: 27
End: 2017-10-09

## 2017-10-09 NOTE — TELEPHONE ENCOUNTER
Panel Management Review      Patient has the following on her problem list:     Depression / Dysthymia review    Measure:  Needs PHQ-9 score of 4 or less during index window.  Administer PHQ-9 and if score is 5 or more, send encounter to provider for next steps.    5 - 7 month window range: 6    PHQ-9 SCORE 7/27/2017   Total Score 10       If PHQ-9 recheck is 5 or more, route to provider for next steps.    Patient is due for:  PHQ9        Composite cancer screening  Chart review shows that this patient is due/due soon for the following Pap Smear  Summary:    Patient is due/failing the following:   PAP    Action needed:   Patient needs office visit for px and pap.    Type of outreach:    Phone, left message for patient to call back.     Questions for provider review:    None                                                                                                                                    Conchis HANNA, ALBA,AAMA       Chart routed to Care Team .

## 2017-10-27 NOTE — ED NOTES
Bed: ED11  Expected date: 8/29/17  Expected time:   Means of arrival:   Comments:  CATHIE  
Patient was in the checkout at a store, began to feel sweaty, lightheaded, and then developed mid chest pain, worse with deep breath.  EMS gave Ativan 0.5 mg IV x2.  Patient now reports that her chest pain is improved, rated 2/10.  ABCs intact.  Patient is alert and oriented x3.      
no

## 2018-01-29 ENCOUNTER — TELEPHONE (OUTPATIENT)
Dept: PEDIATRICS | Facility: CLINIC | Age: 28
End: 2018-01-29

## 2018-01-29 NOTE — TELEPHONE ENCOUNTER
Panel Management Review      Patient has the following on her problem list: None      Composite cancer screening  Chart review shows that this patient is due/due soon for the following Pap Smear  Summary:    Patient is due/failing the following:   PAP and PHYSICAL    Action needed:   Patient needs office visit for pap & physical.    Type of outreach:    Phone, left message for patient to call back.     Questions for provider review:    None                                                                                                                                  Bobbi Ugalde CMA    Chart routed to Care Team .

## 2018-01-29 NOTE — LETTER
February 2, 2018      Isabell Maza  [address]  [place] [address]        Dear Isabell,       We care about your health and have reviewed your health plan including your medical conditions, medications, and lab results.  Based on this review, it is recommended that you follow up regarding the following health topic(s):  -Cervical Cancer Screening  -Wellness (Physical) Visit     We recommend you take the following action(s):  -schedule a WELLNESS (Physical) APPOINTMENT.  We will perform the following labs: pap smear.     Please call us at the [facility] - [phone] (or use joblocal) to address the above recommendations.     Thank you for trusting [facility] and we appreciate the opportunity to serve you.  We look forward to supporting your healthcare needs in the future.    Healthy Regards,    Your Health Care Team  [facility]      Sincerely,    KOJO Ramos CNP

## 2018-02-12 NOTE — TELEPHONE ENCOUNTER
Called and left VM for patient to contact clinic.  Patient needs pap & physical.  Bobbi Ugalde, CMA

## 2018-03-22 DIAGNOSIS — F41.9 ANXIETY: ICD-10-CM

## 2018-03-22 NOTE — TELEPHONE ENCOUNTER
"Requested Prescriptions   Pending Prescriptions Disp Refills     FLUoxetine (PROZAC) 20 MG capsule    Last Written Prescription Date:  9/22/2017  Last Fill Quantity: 30,  # refills: 5   Last office visit: 7/27/2017 with prescribing provider:  Raven Porter APRN CNP    Future Office Visit:     30 capsule 5     Sig: Take 1 capsule (20 mg) by mouth daily    SSRIs Protocol Failed    3/22/2018  1:50 PM       Failed - Recent (12 mo) or future (30 days) visit within the authorizing provider's specialty    Patient had office visit in the last 12 months or has a visit in the next 30 days with authorizing provider or within the authorizing provider's specialty.  See \"Patient Info\" tab in inbasket, or \"Choose Columns\" in Meds & Orders section of the refill encounter.           Passed - Patient is age 18 or older       Passed - No active pregnancy on record       Passed - No positive pregnancy test in last 12 months          "

## 2018-03-22 NOTE — TELEPHONE ENCOUNTER
Prescription approved per [facility]ill Protocol.  Due for annual physical. Please call her to schedule this.  Tiarra Smith, RN  Triage Nurse

## 2018-03-22 NOTE — LETTER
[facility]3]  [address]  [place] [address]  [phone]      April 2, 2018    Isabell Maza                                                                                                                                                       [address]  [place] [address]              Dear Isabell,    According to our records you are due for an annual physical and pap. Please contact our office at your earliest convenience to schedule this appointment.    Thank you  [facility]

## 2018-03-23 NOTE — TELEPHONE ENCOUNTER
Called and left message on patient's cell/home number. Advised Prozac prescription was refilled and looking to schedule annual physical. Left clinic number. When patient calls please assist her in scheduling physical appointment. Ibeth Childress MA

## 2018-04-05 PROBLEM — F41.9 ANXIETY: Status: ACTIVE | Noted: 2017-07-27

## 2018-04-05 PROBLEM — F41.9 ANXIETY: Status: ACTIVE | Noted: 2018-04-05

## 2018-05-11 ENCOUNTER — OFFICE VISIT (OUTPATIENT)
Dept: URGENT CARE | Facility: URGENT CARE | Age: 28
End: 2018-05-11
Payer: COMMERCIAL

## 2018-05-11 VITALS
TEMPERATURE: 97.4 F | HEART RATE: 91 BPM | DIASTOLIC BLOOD PRESSURE: 64 MMHG | SYSTOLIC BLOOD PRESSURE: 112 MMHG | OXYGEN SATURATION: 97 %

## 2018-05-11 DIAGNOSIS — J22 LOWER RESP. TRACT INFECTION: Primary | ICD-10-CM

## 2018-05-11 PROCEDURE — 99213 OFFICE O/P EST LOW 20 MIN: CPT | Performed by: PHYSICIAN ASSISTANT

## 2018-05-11 RX ORDER — AZITHROMYCIN 250 MG/1
TABLET, FILM COATED ORAL
Qty: 6 TABLET | Refills: 0 | Status: SHIPPED | OUTPATIENT
Start: 2018-05-11 | End: 2021-06-30

## 2018-05-11 RX ORDER — CODEINE PHOSPHATE AND GUAIFENESIN 10; 100 MG/5ML; MG/5ML
1 SOLUTION ORAL EVERY 4 HOURS PRN
Qty: 120 ML | Refills: 0 | Status: SHIPPED | OUTPATIENT
Start: 2018-05-11 | End: 2021-06-30

## 2018-05-11 ASSESSMENT — ENCOUNTER SYMPTOMS
SHORTNESS OF BREATH: 0
SINUS PAIN: 0
HEADACHES: 0
COUGH: 1
SORE THROAT: 0
VOMITING: 0
FEVER: 1
WHEEZING: 0

## 2018-05-11 NOTE — MR AVS SNAPSHOT
"              After Visit Summary   2018    Isabell Maza    MRN: [id number]           Patient Information     Date Of Birth          1990        Visit Information        Provider Department      2018 3:05 PM Harriet Ram PA-C [facility]        Today's Diagnoses     Lower resp. tract infection    -  1       Follow-ups after your visit        Who to contact     If you have questions or need follow up information about today's clinic visit or your schedule please contact [facility] directly at [phone].  Normal or non-critical lab and imaging results will be communicated to you by mo9 (moKredit)hart, letter or phone within 4 business days after the clinic has received the results. If you do not hear from us within 7 days, please contact the clinic through mo9 (moKredit)hart or phone. If you have a critical or abnormal lab result, we will notify you by phone as soon as possible.  Submit refill requests through [employer] or call your pharmacy and they will forward the refill request to us. Please allow 3 business days for your refill to be completed.          Additional Information About Your Visit        MyChart Information     [employer] lets you send messages to your doctor, view your test results, renew your prescriptions, schedule appointments and more. To sign up, go to [email or web address] . Click on \"Log in\" on the left side of the screen, which will take you to the Welcome page. Then click on \"Sign up Now\" on the right side of the page.     You will be asked to enter the access code listed below, as well as some personal information. Please follow the directions to create your username and password.     Your access code is: [email or web address]  Expires: 2018  3:33 PM     Your access code will  in 90 days. If you need help or a new code, please call your [facility] clinic or [phone].        Care EveryWhere ID     This is your Care EveryWhere ID. This could be used by " other organizations to access your [place] medical records  [id number]        Your Vitals Were     Pulse Temperature Pulse Oximetry             91 97.4  F (36.3  C) (Tympanic) 97%          Blood Pressure from Last 3 Encounters:   05/11/18 112/64   08/29/17 126/84   07/27/17 104/62    Weight from Last 3 Encounters:   08/29/17 236 lb (107 kg)   07/27/17 238 lb 4.8 oz (108.1 kg)   06/21/17 239 lb (108.4 kg)              Today, you had the following     No orders found for display         Today's Medication Changes          These changes are accurate as of 5/11/18  3:33 PM.  If you have any questions, ask your nurse or doctor.               Start taking these medicines.        Dose/Directions    azithromycin 250 MG tablet   Commonly known as:  ZITHROMAX   Used for:  Lower resp. tract infection   Started by:  Harriet Ram PA-C        Two tablets first day, then one tablet daily for four days.   Quantity:  6 tablet   Refills:  0       guaiFENesin-codeine 100-10 MG/5ML Soln solution   Commonly known as:  ROBITUSSIN AC   Used for:  Lower resp. tract infection   Started by:  Harriet Ram PA-C        Dose:  1 tsp.   Take 5 mLs by mouth every 4 hours as needed for cough   Quantity:  120 mL   Refills:  0         Stop taking these medicines if you haven't already. Please contact your care team if you have questions.     FLUoxetine 20 MG capsule   Commonly known as:  PROzac   Stopped by:  Harriet Ram PA-C                Where to get your medicines      Some of these will need a paper prescription and others can be bought over the counter.  Ask your nurse if you have questions.     Bring a paper prescription for each of these medications     azithromycin 250 MG tablet    guaiFENesin-codeine 100-10 MG/5ML Soln solution                Primary Care Provider Office Phone # Fax #    [facility] [phone] [address] [address]        Equal Access  to Services     CHINA NAVARRO : Sebastian Conde, wamyrada davidadaha, qadomislade kalillydixie yun, ralph fernandez. So [facility] [phone].    ATENCIÓN: Si habla español, tiene a carrizales disposición servicios gratuitos de asistencia lingüística. Llame al [phone].    We comply with applicable federal civil rights laws and [place] laws. We do not discriminate on the basis of race, color, national origin, age, disability, sex, sexual orientation, or gender identity.            Thank you!     Thank you for choosing [facility]  for your care. Our goal is always to provide you with excellent care. Hearing back from our patients is one way we can continue to improve our services. Please take a few minutes to complete the written survey that you may receive in the mail after your visit with us. Thank you!             Your Updated Medication List - Protect others around you: Learn how to safely use, store and throw away your medicines at www.disposemymeds.org.          This list is accurate as of 5/11/18  3:33 PM.  Always use your most recent med list.                   Brand Name Dispense Instructions for use Diagnosis    azithromycin 250 MG tablet    ZITHROMAX    6 tablet    Two tablets first day, then one tablet daily for four days.    Lower resp. tract infection       guaiFENesin-codeine 100-10 MG/5ML Soln solution    ROBITUSSIN AC    120 mL    Take 5 mLs by mouth every 4 hours as needed for cough    Lower resp. tract infection

## 2018-05-11 NOTE — PROGRESS NOTES
SUBJECTIVE:  Isabell Maza is a 28 year old female who presents to the clinic today with a chief complaint of cough  for 3 week(s).  Her cough is described as harsh.    The patient's symptoms are moderate and worsening.  Associated symptoms include congestion and nasal congestion. The patient's symptoms are exacerbated by lying down  Patient has been using OTC cough suppressants  to improve symptoms.    No past medical history on file.    No current outpatient prescriptions on file.       Social History   Substance Use Topics     Smoking status: Never Smoker     Smokeless tobacco: Never Used     Alcohol use Yes     Review of Systems   Constitutional: Positive for fever (resolved).   HENT: Positive for congestion. Negative for ear pain, sinus pain and sore throat.    Respiratory: Positive for cough. Negative for shortness of breath and wheezing.    Cardiovascular: Negative for chest pain.   Gastrointestinal: Negative for vomiting.   Neurological: Negative for headaches.         OBJECTIVE:  /64 (BP Location: Right arm, Patient Position: Chair, Cuff Size: Adult Regular)  Pulse 91  Temp 97.4  F (36.3  C) (Tympanic)  SpO2 97%  GENERAL APPEARANCE: healthy, alert and no distress  EYES: EOMI,  PERRL, conjunctiva clear  HENT: ear canals and TM's normal.  Nose and mouth without ulcers, erythema or lesions  NECK: supple, nontender, no lymphadenopathy  RESP: lungs clear to auscultation - no rales, rhonchi or wheezes  CV: regular rates and rhythm, normal S1 S2, no murmur noted  NEURO: Normal strength and tone, sensory exam grossly normal,  normal speech and mentation  SKIN: no suspicious lesions or rashes    ASSESSMENT/PLAN:  1. Lower resp. tract infection  Push fluids and rest. Honey for cough, humidified air at night.     - guaiFENesin-codeine (ROBITUSSIN AC) 100-10 MG/5ML SOLN solution; Take 5 mLs by mouth every 4 hours as needed for cough  Dispense: 120 mL; Refill: 0  - azithromycin (ZITHROMAX) 250 MG tablet;  Two tablets first day, then one tablet daily for four days.  Dispense: 6 tablet; Refill: 0    Harriet Ram PA-C

## 2018-05-14 ENCOUNTER — TELEPHONE (OUTPATIENT)
Dept: PEDIATRICS | Facility: CLINIC | Age: 28
End: 2018-05-14

## 2018-05-14 NOTE — TELEPHONE ENCOUNTER
Panel Management Review      Patient has the following on her problem list: None      Composite cancer screening  Chart review shows that this patient is due/due soon for the following Pap Smear  Summary:    Patient is due/failing the following:   NICKOLAS, PAP and PHYSICAL    Action needed:   Patient needs office visit for pap & physical.    Type of outreach:    Phone, left message for patient to call back.     Questions for provider review:    None                                                                                                                                  Bobbi Ugalde CMA    Chart routed to Care Team .

## 2018-05-14 NOTE — LETTER
May 18, 2018      Isabell Maza  [address]  [place] [address]        Dear Isabell,       We care about your health and have reviewed your health plan including your medical conditions, medications, and lab results.  Based on this review, it is recommended that you follow up regarding the following health topic(s):  -Cervical Cancer Screening  -Wellness (Physical) Visit   -Anxiety    We recommend you take the following action(s):  -schedule a WELLNESS (Physical) APPOINTMENT.  We will perform the following labs: pap smear.     Please call us at the [facility] - [phone] (or use BoldIQ) to address the above recommendations.     Thank you for trusting [facility] and we appreciate the opportunity to serve you.  We look forward to supporting your healthcare needs in the future.    Healthy Regards,    Your Health Care Team  [facility]      Sincerely,    KOJO Ramos CNP

## 2018-05-29 NOTE — TELEPHONE ENCOUNTER
Called and left VM for patient to contact clinic.  Patient needs physical, pap & NICKOLAS.  Bobbi Ugalde, CMA

## 2019-09-05 ENCOUNTER — TRANSFERRED RECORDS (OUTPATIENT)
Dept: HEALTH INFORMATION MANAGEMENT | Facility: CLINIC | Age: 29
End: 2019-09-05

## 2019-10-31 ENCOUNTER — TRANSFERRED RECORDS (OUTPATIENT)
Dept: HEALTH INFORMATION MANAGEMENT | Facility: CLINIC | Age: 29
End: 2019-10-31

## 2019-11-07 ENCOUNTER — TRANSFERRED RECORDS (OUTPATIENT)
Dept: HEALTH INFORMATION MANAGEMENT | Facility: CLINIC | Age: 29
End: 2019-11-07

## 2020-02-08 ENCOUNTER — NURSE TRIAGE (OUTPATIENT)
Dept: NURSING | Facility: CLINIC | Age: 30
End: 2020-02-08

## 2020-02-08 ENCOUNTER — HOSPITAL ENCOUNTER (EMERGENCY)
Facility: CLINIC | Age: 30
Discharge: HOME | End: 2020-02-08
Attending: EMERGENCY MEDICINE | Admitting: EMERGENCY MEDICINE
Payer: COMMERCIAL

## 2020-02-08 VITALS
BODY MASS INDEX: 35.31 KG/M2 | HEART RATE: 63 BPM | DIASTOLIC BLOOD PRESSURE: 65 MMHG | SYSTOLIC BLOOD PRESSURE: 106 MMHG | RESPIRATION RATE: 18 BRPM | HEIGHT: 67 IN | TEMPERATURE: 97.6 F | OXYGEN SATURATION: 98 % | WEIGHT: 225 LBS

## 2020-02-08 DIAGNOSIS — R19.7 VOMITING AND DIARRHEA: ICD-10-CM

## 2020-02-08 DIAGNOSIS — R11.10 VOMITING AND DIARRHEA: ICD-10-CM

## 2020-02-08 DIAGNOSIS — G44.209 TENSION HEADACHE: ICD-10-CM

## 2020-02-08 DIAGNOSIS — R50.9 FEVER IN ADULT: ICD-10-CM

## 2020-02-08 LAB
ALBUMIN UR-MCNC: 30 MG/DL
ANION GAP SERPL CALCULATED.3IONS-SCNC: 4 MMOL/L (ref 3–14)
ANION GAP SERPL CALCULATED.3IONS-SCNC: NORMAL MMOL/L (ref 6–17)
APPEARANCE UR: CLEAR
BACTERIA #/AREA URNS HPF: ABNORMAL /HPF
BASOPHILS # BLD AUTO: 0 10E9/L (ref 0–0.2)
BASOPHILS NFR BLD AUTO: 0.5 %
BILIRUB UR QL STRIP: NEGATIVE
BUN SERPL-MCNC: 5 MG/DL (ref 7–30)
BUN SERPL-MCNC: NORMAL MG/DL (ref 7–30)
CALCIUM SERPL-MCNC: 8.9 MG/DL (ref 8.5–10.1)
CALCIUM SERPL-MCNC: NORMAL MG/DL (ref 8.5–10.1)
CHLORIDE SERPL-SCNC: 108 MMOL/L (ref 94–109)
CHLORIDE SERPL-SCNC: NORMAL MMOL/L (ref 94–109)
CO2 SERPL-SCNC: 27 MMOL/L (ref 20–32)
CO2 SERPL-SCNC: NORMAL MMOL/L (ref 20–32)
COLOR UR AUTO: YELLOW
CREAT SERPL-MCNC: 0.7 MG/DL (ref 0.52–1.04)
CREAT SERPL-MCNC: NORMAL MG/DL (ref 0.52–1.04)
DIFFERENTIAL METHOD BLD: NORMAL
EOSINOPHIL # BLD AUTO: 0.2 10E9/L (ref 0–0.7)
EOSINOPHIL NFR BLD AUTO: 2.8 %
ERYTHROCYTE [DISTWIDTH] IN BLOOD BY AUTOMATED COUNT: 12.7 % (ref 10–15)
FLUAV+FLUBV AG SPEC QL: NEGATIVE
FLUAV+FLUBV AG SPEC QL: NEGATIVE
GFR SERPL CREATININE-BSD FRML MDRD: >90 ML/MIN/{1.73_M2}
GFR SERPL CREATININE-BSD FRML MDRD: NORMAL ML/MIN/{1.73_M2}
GLUCOSE SERPL-MCNC: 87 MG/DL (ref 70–99)
GLUCOSE SERPL-MCNC: NORMAL MG/DL (ref 70–99)
GLUCOSE UR STRIP-MCNC: NEGATIVE MG/DL
HCT VFR BLD AUTO: 43.6 % (ref 35–47)
HGB BLD-MCNC: 14.2 G/DL (ref 11.7–15.7)
HGB UR QL STRIP: NEGATIVE
HYALINE CASTS #/AREA URNS LPF: 1 /LPF (ref 0–2)
IMM GRANULOCYTES # BLD: 0 10E9/L (ref 0–0.4)
IMM GRANULOCYTES NFR BLD: 0.4 %
KETONES UR STRIP-MCNC: NEGATIVE MG/DL
LEUKOCYTE ESTERASE UR QL STRIP: NEGATIVE
LYMPHOCYTES # BLD AUTO: 1.6 10E9/L (ref 0.8–5.3)
LYMPHOCYTES NFR BLD AUTO: 27.8 %
MCH RBC QN AUTO: 31.3 PG (ref 26.5–33)
MCHC RBC AUTO-ENTMCNC: 32.6 G/DL (ref 31.5–36.5)
MCV RBC AUTO: 96 FL (ref 78–100)
MONOCYTES # BLD AUTO: 0.7 10E9/L (ref 0–1.3)
MONOCYTES NFR BLD AUTO: 12.6 %
MUCOUS THREADS #/AREA URNS LPF: PRESENT /LPF
NEUTROPHILS # BLD AUTO: 3.2 10E9/L (ref 1.6–8.3)
NEUTROPHILS NFR BLD AUTO: 55.9 %
NITRATE UR QL: NEGATIVE
NRBC # BLD AUTO: 0 10*3/UL
NRBC BLD AUTO-RTO: 0 /100
PH UR STRIP: 6 PH (ref 5–7)
PLATELET # BLD AUTO: 232 10E9/L (ref 150–450)
POTASSIUM SERPL-SCNC: 3.5 MMOL/L (ref 3.4–5.3)
POTASSIUM SERPL-SCNC: NORMAL MMOL/L (ref 3.4–5.3)
RBC # BLD AUTO: 4.54 10E12/L (ref 3.8–5.2)
RBC #/AREA URNS AUTO: 2 /HPF (ref 0–2)
SODIUM SERPL-SCNC: 139 MMOL/L (ref 133–144)
SODIUM SERPL-SCNC: NORMAL MMOL/L (ref 133–144)
SOURCE: ABNORMAL
SP GR UR STRIP: 1.01 (ref 1–1.03)
SPECIMEN SOURCE: NORMAL
SQUAMOUS #/AREA URNS AUTO: 5 /HPF (ref 0–1)
UROBILINOGEN UR STRIP-MCNC: 3 MG/DL (ref 0–2)
WBC # BLD AUTO: 5.7 10E9/L (ref 4–11)
WBC #/AREA URNS AUTO: 5 /HPF (ref 0–5)

## 2020-02-08 PROCEDURE — 25800030 ZZH RX IP 258 OP 636: Performed by: EMERGENCY MEDICINE

## 2020-02-08 PROCEDURE — 99283 EMERGENCY DEPT VISIT LOW MDM: CPT | Mod: 25

## 2020-02-08 PROCEDURE — 85025 COMPLETE CBC W/AUTO DIFF WBC: CPT | Performed by: EMERGENCY MEDICINE

## 2020-02-08 PROCEDURE — 36415 COLL VENOUS BLD VENIPUNCTURE: CPT | Performed by: EMERGENCY MEDICINE

## 2020-02-08 PROCEDURE — 80048 BASIC METABOLIC PNL TOTAL CA: CPT | Performed by: EMERGENCY MEDICINE

## 2020-02-08 PROCEDURE — 81001 URINALYSIS AUTO W/SCOPE: CPT | Performed by: EMERGENCY MEDICINE

## 2020-02-08 PROCEDURE — 96360 HYDRATION IV INFUSION INIT: CPT

## 2020-02-08 PROCEDURE — 87804 INFLUENZA ASSAY W/OPTIC: CPT | Performed by: EMERGENCY MEDICINE

## 2020-02-08 PROCEDURE — 96361 HYDRATE IV INFUSION ADD-ON: CPT

## 2020-02-08 RX ORDER — LIDOCAINE 40 MG/G
CREAM TOPICAL
Status: DISCONTINUED | OUTPATIENT
Start: 2020-02-08 | End: 2020-02-08 | Stop reason: HOSPADM

## 2020-02-08 RX ORDER — SODIUM CHLORIDE 9 MG/ML
INJECTION, SOLUTION INTRAVENOUS CONTINUOUS
Status: DISCONTINUED | OUTPATIENT
Start: 2020-02-08 | End: 2020-02-08 | Stop reason: HOSPADM

## 2020-02-08 RX ADMIN — SODIUM CHLORIDE 1000 ML: 9 INJECTION, SOLUTION INTRAVENOUS at 13:42

## 2020-02-08 ASSESSMENT — ENCOUNTER SYMPTOMS
DIARRHEA: 1
HEADACHES: 1
NECK PAIN: 1
FEVER: 1
ABDOMINAL PAIN: 0
NAUSEA: 1
BACK PAIN: 1
VOMITING: 1

## 2020-02-08 ASSESSMENT — MIFFLIN-ST. JEOR: SCORE: 1763.22

## 2020-02-08 NOTE — DISCHARGE INSTRUCTIONS
Discharge Instructions  Upper Respiratory Infection    The upper respiratory tract includes the sinuses, nasal passages, pharynx, and larynx. A URI, or upper respiratory infection, is an infection of any of the parts of the upper airway. Symptoms include runny nose, congestion, sore throat, cough, and fever. URIs are almost always caused by a virus. Antibiotics do not help with virus infections, so are not used for an ordinary URI. A URI is very contagious through coughing and nasal secretions; make sure you wash your hands often and clean surfaces after sneezing, coughing or touching them.  Viruses can live on surfaces for up to 3 days.      Return to the Emergency Department if:  Any of the symptoms you have get much worse.  You seem very sick, like being too weak to get up.  You have any new symptoms, especially serious things like chest pain.   You are short of breath.   You have a severe headache.  You are vomiting so much you can t keep fluids or medicines down.  You have confusion or seem unusually drowsy.  You have a seizure or convulsion.    Follow-up:    You should start to improve in 3 - 5 days.  A cough can linger for up to six weeks, but overall you should be feeling much better.  See your doctor if you have a fever for more than 3 days, or if you are not feeling better within 5 days.      What can I do to help myself?  Fill any prescriptions the doctor gave you and take them right away  If you have a fever, get plenty of rest and drink lots of fluids, especially water. Using a humidifier or saline nose spray will also help loosen secretions.   What clothes or blankets you have on won t change your fever. Do what is comfortable for you.  Bathing or sponging in lukewarm water may help you feel better.  Tylenol  (acetaminophen), Motrin  (ibuprofen), or Advil  (ibuprofen) help bring fever down and may help you feel more comfortable. Be sure to read and follow the package directions, and ask your doctor if  you have questions.  Do not drink alcohol.  Decongestants may help you feel better. You may use decongestant nose sprays Afrin  (oxymetazoline) or Bran-Synephrine  (phenylephrine hydrochloride) for up to 3 days, or may use a decongestant tablet like Sudafed  (pseudoephedrine).  If you were given a prescription for medicine here today, be sure to read all of the information (including the package insert) that comes with your prescription.  This will include important information about the medicine, its side effects, and any warnings that you need to know about.  The pharmacist who fills the prescription can provide more information and answer questions you may have about the medicine.  If you have questions or concerns that the pharmacist cannot address, please call or return to the Emergency Department.   Opioid Medication Information    Pain medications are among the most commonly prescribed medicines, so we are including this information for all our patients. If you did not receive pain medication or get a prescription for pain medicine, you can ignore it.     You may have been given a prescription for an opioid (narcotic) pain medicine and/or have received a pain medicine while here in the Emergency Department. These medicines can make you drowsy or impaired. You must not drive, operate dangerous equipment, or engage in any other dangerous activities while taking these medications. If you drive while taking these medications, you could be arrested for DUI, or driving under the influence. Do not drink any alcohol while you are taking these medications.     Opioid pain medications can cause addiction. If you have a history of chemical dependency of any type, you are at a higher risk of becoming addicted to pain medications.  Only take these prescribed medications to treat your pain when all other options have been tried. Take it for as short a time and as few doses as possible. Store your pain pills in a secure  place, as they are frequently stolen and provide a dangerous opportunity for children or visitors in your house to start abusing these powerful medications. We will not replace any lost or stolen medicine.  As soon as your pain is better, you should flush all your remaining medication.     Many prescription pain medications contain Tylenol  (acetaminophen), including Vicodin , Tylenol #3 , Norco , Lortab , and Percocet .  You should not take any extra pills of Tylenol  if you are using these prescription medications or you can get very sick.  Do not ever take more than 3000 mg of acetaminophen in any 24 hour period.    All opioids tend to cause constipation. Drink plenty of water and eat foods that have a lot of fiber, such as fruits, vegetables, prune juice, apple juice and high fiber cereal.  Take a laxative if you don t move your bowels at least every other day. Miralax , Milk of Magnesia, Colace , or Senna  can be used to keep you regular.      Remember that you can always come back to the Emergency Department if you are not able to see your regular doctor in the amount of time listed above, if you get any new symptoms, or if there is anything that worries you.

## 2020-02-08 NOTE — TELEPHONE ENCOUNTER
She has someone she can call to get her in to the ER for rehydration. She is a bariatric patient and has a difficult time taking in a lot of fluids. She had vomiting and diarrhea Thursday and Friday.  Stacey Anderson RN-[facility]      Reason for Disposition    Patient sounds very sick or weak to the triager    Additional Information    Negative: [1] Weakness (i.e., paralysis, loss of muscle strength) of the face, arm or leg on one side of the body AND [2] sudden onset AND [3] present now    Negative: [1] Numbness (i.e., loss of sensation) of the face, arm or leg on one side of the body AND [2] sudden onset AND [3] present now    Negative: [1] Loss of speech or garbled speech AND [2] sudden onset AND [3] present now    Negative: Difficult to awaken or acting confused (e.g., disoriented, slurred speech)    Negative: Sounds like a life-threatening emergency to the triager    Negative: Followed a head injury    Negative: Followed an ear injury    Negative: Localized weakness or numbness is main symptom    Negative: Dizziness relates to riding in a car, going to an amusement park, etc.    Negative: [1] Dizziness is main symptom AND [2] NO spinning sensation (i.e., vertigo)    Negative: SEVERE dizziness (vertigo) (e.g., unable to walk without assistance)    Negative: [1] Dizziness (vertigo) present now AND [2] one or more stroke risk factors (i.e., hypertension, diabetes, prior stroke/TIA/heart attack)  (Exception: prior physician evaluation for this AND no different/worse than usual)    Negative: [1] Dizziness (vertigo) present now AND [2] age > 60  (Exception: prior physician evaluation for this AND no different/worse than usual)    Negative: Severe headache (e.g., excruciating)  (Exception: similar to previous migraines)     Pain at 7    Protocols used: DIZZINESS - VERTIGO-A-AH

## 2020-02-08 NOTE — ED AVS SNAPSHOT
[facility] Emergency Department  [address]  [facility] [address]  Phone:  [phone]  Fax:  [phone]                                    Isabell Maza   MRN: [id number]    Department:  [facility] Emergency Department   Date of Visit:  2/8/2020           After Visit Summary Signature Page    I have received my discharge instructions, and my questions have been answered. I have discussed any challenges I see with this plan with the nurse or doctor.    ..........................................................................................................................................  Patient/Patient Representative Signature      ..........................................................................................................................................  Patient Representative Print Name and Relationship to Patient    ..................................................               ................................................  Date                                   Time    ..........................................................................................................................................  Reviewed by Signature/Title    ...................................................              ..............................................  Date                                               Time          22EPIC Rev 08/18

## 2020-02-08 NOTE — ED PROVIDER NOTES
"History     Chief Complaint:  Headache       HPI   Isabell Maza is a 30 year old female who presents with headache. The patient reports having nausea, vomiting, and diarrhea the past 2 days. She reports having 3 episodes of vomiting and 13 episodes of diarrhea. She had a fever of 103.8 yesterday which resolved after taking tylenol. The patient states that she woke up today with neck pain, upper back pain, and headache as well. She tried taking tylenol at 1000 today without significant improvement. She denies any trauma or abdominal pain.     Allergies:  No Known Allergies     Medications:    Medications reviewed. No current medications.     Past Medical History:    Hypertension   Migraines   Anxiety     Past Surgical History:     section   Gastrectomy sleeve  Hiatal hernia repair     Family History:    Hyperlipidemia    Hypertension     Social History:  Smoking Status: Never Smoker  Smokeless Tobacco: Never Used  Alcohol Use: Yes  Drug Use: No  PCP: Keenan Espinal       Review of Systems   Constitutional: Positive for fever.   Gastrointestinal: Positive for diarrhea, nausea and vomiting. Negative for abdominal pain.   Musculoskeletal: Positive for back pain (upper) and neck pain.   Neurological: Positive for headaches.   All other systems reviewed and are negative.      Physical Exam     Patient Vitals for the past 24 hrs:   BP Temp Temp src Pulse Resp SpO2 Height Weight   20 1431 -- 97.6  F (36.4  C) Oral -- -- 100 % -- --   20 1430 106/65 -- -- 63 -- -- -- --   20 1240 (!) 113/95 98  F (36.7  C) Oral 71 20 98 % 1.702 m (5' 7\") 102.1 kg (225 lb)        Physical Exam  General: The patient is alert, in no respiratory distress.    HENT: Mucous membranes moist.    Cardiovascular: Regular rate and rhythm. Good pulses in all four extremities. Normal capillary refill and skin turgor.     Respiratory: Lungs are clear. No nasal flaring. No retractions. No wheezing, no " crackles.    Gastrointestinal: Abdomen soft. No guarding, no rebound. No palpable hernias. No abdominal tenderness     Musculoskeletal: No gross deformity. Muscular soreness over both trapezius muscles.     Skin: No rashes or petechiae.     Neurologic: The patient is alert and oriented x3. GCS 15. No testable cranial nerve deficit. Follows commands with clear and appropriate speech. Gives appropriate answers. Good strength in all extremities. No gross neurologic deficit. Gross sensation intact. Pupils are round and reactive. No meningismus.     Lymphatic: No cervical adenopathy. No lower extremity swelling.    Psychiatric: The patient is non-tearful.     Emergency Department Course     Laboratory:  Laboratory findings were communicated with the patient who voiced understanding of the findings.    BMP: bun 5 (L), o/w WNL (Creatinine: 0.70)     Influenza A/B antigen: negative     CBC:  WBC 5.7, HGB 14.2, , o/w WNL     UA with micro: Protein Albumin Urine 30 (A) Urobilinogen mg/dL 3.0 (H) Bacteria few (A) Squamous epithelial/HPF urine 5 (H) Mucous urine present (A)  o/w wnl/negative       Interventions:  1342 0.9% sodium chloride BOLUS 1000 mL IV     Emergency Department Course:  Past medical records, nursing notes, and vitals reviewed.    1308 I performed an exam of the patient as documented above. We discussed doing a lumbar puncture.    IV was inserted and blood was drawn for laboratory testing, results above.     The patient provided a urine sample here in the emergency department. This was sent for laboratory testing, findings above.      1440 Patient rechecked and updated.      1523 Patient rechecked and updated.      Findings and plan explained to the Patient. Patient discharged home with instructions regarding supportive care, medications, and reasons to return. The importance of close follow-up was reviewed.      Impression & Plan     Medical Decision Making:  Isabell Maza is a 30 year old female  who presents to the emergency department today with the patient presents with a syndrome of upper respiratory illness complaining of some mild frontal headache as well as muscle aches and some neck pain the neck pain appears to be more along the trapezius muscles she says does radiate her back but there is no meningismus.  I did however discuss with the previous fever headache neck pain about meningitis she did not want a spinal tap and was adamant about this.  I did check labs on her which are reassuring her urine is clear there is been no trauma but I felt more like this is a viral illness causing myalgias as a cause behind this.  The patient agreed to return if she should worsen or change and was discharged home after ended without segment problems she did appear singly dehydrated but was able to urinate after aggressive hydration here.      Discharge Diagnosis:    ICD-10-CM    1. Fever in adult R50.9    2. Tension headache G44.209    3. Vomiting and diarrhea R11.10     R19.7        Disposition:  The patient is discharged to home.    Scribe Disclosure:  I, Rey Hanna, am serving as a scribe at 1:08 PM on 2/8/2020 to document services personally performed by Benito Malloy MD based on my observations and the provider's statements to me.      2/8/2020   Benito Malloy MD Farnan, Christopher M, MD  02/08/20 1533

## 2020-02-08 NOTE — ED TRIAGE NOTES
Pt reports 24 hours of nausea, vomiting and diarrhea, fever last night at home 103.8. today c/o pain in neck and headache.

## 2020-08-29 ENCOUNTER — HOSPITAL ENCOUNTER (EMERGENCY)
Facility: CLINIC | Age: 30
Discharge: HOME | End: 2020-08-29
Attending: EMERGENCY MEDICINE | Admitting: EMERGENCY MEDICINE
Payer: COMMERCIAL

## 2020-08-29 VITALS
DIASTOLIC BLOOD PRESSURE: 104 MMHG | SYSTOLIC BLOOD PRESSURE: 148 MMHG | TEMPERATURE: 98.3 F | OXYGEN SATURATION: 99 % | RESPIRATION RATE: 20 BRPM | HEART RATE: 112 BPM

## 2020-08-29 DIAGNOSIS — F41.0 ANXIETY ATTACK: ICD-10-CM

## 2020-08-29 PROCEDURE — 90791 PSYCH DIAGNOSTIC EVALUATION: CPT

## 2020-08-29 PROCEDURE — 25000132 ZZH RX MED GY IP 250 OP 250 PS 637: Performed by: EMERGENCY MEDICINE

## 2020-08-29 PROCEDURE — 99285 EMERGENCY DEPT VISIT HI MDM: CPT | Mod: 25

## 2020-08-29 RX ORDER — LORAZEPAM 1 MG/1
1 TABLET ORAL ONCE
Status: COMPLETED | OUTPATIENT
Start: 2020-08-29 | End: 2020-08-29

## 2020-08-29 RX ORDER — LORAZEPAM 1 MG/1
1 TABLET ORAL EVERY 8 HOURS PRN
Qty: 10 TABLET | Refills: 0 | Status: SHIPPED | OUTPATIENT
Start: 2020-08-29 | End: 2021-06-30

## 2020-08-29 RX ADMIN — LORAZEPAM 1 MG: 1 TABLET ORAL at 20:42

## 2020-08-29 ASSESSMENT — ENCOUNTER SYMPTOMS
SLEEP DISTURBANCE: 1
DECREASED CONCENTRATION: 1
NERVOUS/ANXIOUS: 1
HALLUCINATIONS: 0

## 2020-08-29 NOTE — ED AVS SNAPSHOT
[facility] Emergency Department  [address]  [facility] [address]  Phone:  [phone]  Fax:  [phone]                                    Isabell Maza   MRN: [id number]    Department:  [facility] Emergency Department   Date of Visit:  8/29/2020           After Visit Summary Signature Page    I have received my discharge instructions, and my questions have been answered. I have discussed any challenges I see with this plan with the nurse or doctor.    ..........................................................................................................................................  Patient/Patient Representative Signature      ..........................................................................................................................................  Patient Representative Print Name and Relationship to Patient    ..................................................               ................................................  Date                                   Time    ..........................................................................................................................................  Reviewed by Signature/Title    ...................................................              ..............................................  Date                                               Time          22EPIC Rev 08/18

## 2020-08-30 NOTE — ED TRIAGE NOTES
Pt states increased anxiety for one week, states previously saw counselor for same. States has not previously taken medications for anxiety. Pt denies SI/HI. ABCs intact GCS 15

## 2020-08-30 NOTE — ED PROVIDER NOTES
History     Chief Complaint:  Anxiety      HPI   Isabell Maza is a 30 year old female who presents with mother for assistance with escalating anxiety.  She has a longstanding history of anxiety.  She is not currently on any medications and last saw a therapist around November 2019.  Over the past couple weeks her anxiety has been escalating.  She cites psychosocial triggers including her marital situation and concern for child.  There is no drug or alcohol use.  Today she is describing what she is reporting is a full on panic attack.  She has had no associated depression or suicidal ideation.    Allergies:  No known drug allergies      Medications:    Colace  Vitamin B12 and D3  Prilosec     Past Medical History:    Anxiety   Morbid obesity  Acute blood loss anemia  Pulmonary hypertension  Migraines  Neon-rheumatic mitral regurgitation     Past Surgical History:    History reviewed. No pertinent surgical history.    Family History:    History reviewed. No pertinent family history.      Social History:  Smoking status: Never smoker  Alcohol use: Yes   Drug use: No  PCP: Keenan Espinal   Marital Status:   [2]     Review of Systems   Psychiatric/Behavioral: Positive for decreased concentration and sleep disturbance. Negative for hallucinations and self-injury. The patient is nervous/anxious.    All other systems reviewed and are negative.    Physical Exam     Patient Vitals for the past 24 hrs:   BP Temp Temp src Pulse Resp SpO2   08/29/20 2000 (!) 148/104 98.3  F (36.8  C) Oral 112 20 99 %      Physical Exam  General: Patient is alert and cooperative.  HENT:  Normal appearance.   Eyes: EOMI. Normal conjunctiva.  Neck:  Normal range of motion and appearance.   Cardiovascular:  tachycardic rate.    Pulmonary/Chest:  Effort normal.      Musculoskeletal: Normal range of motion. No edema or tenderness.   Neurological: oriented, normal strength, sensation, and coordination.   Skin: Warm and dry. No  rash or bruising.   Psychiatric: anxious mood and affect.    Emergency Department Course   Interventions:  2042, Ativan, 1 mg, PO    Emergency Department Course:  Past medical records, nursing notes, and vitals reviewed.  2015: I performed an exam of the patient and obtained history, as documented above.     2200: I rechecked the patient. Explained findings to patient.     Findings and plan explained to the Patient. Patient discharged home with instructions regarding supportive care, medications, and reasons to return. The importance of close follow-up was reviewed. The patient was prescribed Ativan.      Impression & Plan    Medical Decision Making:  Sober and cooperative 30-year-old female with a history of anxiety presents with an exacerbation escalating to a panic attack.  She was medicated with oral lorazepam and a dec consultation was obtained.  She has no associated depression or suicidal ideations.  Outpatient mental health was recommended and patient is agreeable.  Appointments are being made and she is stable for discharge with a prescription for PRN lorazepam in the company of her supportive mother.    Diagnosis:    ICD-10-CM    1. Anxiety attack  F41.0        Disposition:  Discharged to home with Ativan.     Discharge Medications:  New Prescriptions    LORAZEPAM (ATIVAN) 1 MG TABLET    Take 1 tablet (1 mg) by mouth every 8 hours as needed for anxiety     Scribe Disclosure:  I, Maynor Lazcano, am serving as a scribe at 8:11 PM on 8/29/2020 to document services personally performed by Hector Heck MD based on my observations and the provider's statements to me.      Maynor Lazcano  8/29/2020   [facility] EMERGENCY DEPARTMENT       Hector Heck MD  08/29/20 6308

## 2021-06-16 ENCOUNTER — TELEPHONE (OUTPATIENT)
Dept: OBGYN | Facility: CLINIC | Age: 31
End: 2021-06-16

## 2021-06-16 NOTE — TELEPHONE ENCOUNTER
Reason for Call:  Other appointment    Detailed comments: Patient would like to get her IUD taken out. Please call her to schedule     Phone Number Patient can be reached at: [phone]    Best Time:     Can we leave a detailed message on this number? NO    Call taken on 6/16/2021 at 4:06 PM by Britta Narvaez

## 2021-06-30 ENCOUNTER — OFFICE VISIT (OUTPATIENT)
Dept: OBGYN | Facility: CLINIC | Age: 31
End: 2021-06-30
Payer: COMMERCIAL

## 2021-06-30 VITALS — SYSTOLIC BLOOD PRESSURE: 120 MMHG | BODY MASS INDEX: 36.65 KG/M2 | DIASTOLIC BLOOD PRESSURE: 92 MMHG | WEIGHT: 234 LBS

## 2021-06-30 DIAGNOSIS — Z12.4 PAP SMEAR FOR CERVICAL CANCER SCREENING: Primary | ICD-10-CM

## 2021-06-30 DIAGNOSIS — R03.0 ELEVATED BP WITHOUT DIAGNOSIS OF HYPERTENSION: ICD-10-CM

## 2021-06-30 PROBLEM — G47.19 EXCESSIVE DAYTIME SLEEPINESS: Status: ACTIVE | Noted: 2021-06-30

## 2021-06-30 PROBLEM — I27.20 PULMONARY HYPERTENSION (H): Status: ACTIVE | Noted: 2019-07-08

## 2021-06-30 PROBLEM — I34.0 NON-RHEUMATIC MITRAL REGURGITATION: Status: ACTIVE | Noted: 2019-07-08

## 2021-06-30 PROBLEM — E66.01 MORBID OBESITY WITH BMI OF 40.0-44.9, ADULT (H): Status: ACTIVE | Noted: 2019-07-08

## 2021-06-30 PROCEDURE — 99202 OFFICE O/P NEW SF 15 MIN: CPT | Performed by: ADVANCED PRACTICE MIDWIFE

## 2021-06-30 RX ORDER — BUPROPION HYDROCHLORIDE 150 MG/1
300 TABLET ORAL EVERY MORNING
COMMUNITY
End: 2023-03-29

## 2021-06-30 RX ORDER — PROPRANOLOL HYDROCHLORIDE 20 MG/1
TABLET ORAL
COMMUNITY
Start: 2021-05-17 | End: 2021-12-28

## 2021-06-30 RX ORDER — ESCITALOPRAM OXALATE 20 MG/1
20 TABLET ORAL DAILY
COMMUNITY
Start: 2021-05-15 | End: 2024-02-13

## 2021-06-30 RX ORDER — ALPRAZOLAM 0.25 MG
TABLET ORAL
COMMUNITY
Start: 2021-04-06

## 2021-06-30 NOTE — NURSING NOTE
"Chief Complaint   Patient presents with     IUD     Mirena removal     Contraception     consult       Initial BP (!) 120/92 (BP Location: Left arm, Cuff Size: Adult Regular)   Wt 106.1 kg (234 lb)   BMI 36.65 kg/m   Estimated body mass index is 36.65 kg/m  as calculated from the following:    Height as of 20: 1.702 m (5' 7\").    Weight as of this encounter: 106.1 kg (234 lb).  BP completed using cuff size: regular    Questioned patient about current smoking habits.  Pt. has never smoked.          The following HM Due: pap smear    Norma Nance CMA    "

## 2021-06-30 NOTE — PROGRESS NOTES
IUD Consult for Removal    Assessment:  -Mirena IUD, in place for 5 years  -Considering conception  -Elevated blood pressure w/o diagnosis of hypertension    Plan:  -Discussed Mirena method of action, side effects, efficacy x7 years, difference between real-world efficacy and FDA approval.  -Reviewed birth control options, efficacy, time to conceive following discontinuation  -After reviewing this information, Isabell opts to keep her Mirena in place. Will RTC for removal if ready to attempt to conceive, otherwise will plan for removal / replacement at 7 year tabatha (Aug 2023)  -Declines rx for prenatal vitamin-- discussed getting this OTC about 2 months prior to planned conception  -RTC for routine health maintenance, sooner if problems    Subjective:  Isabell presents to clinic to discuss birth control options. Discussing with her partner whether or not they want to attempt conception following a traumatic  / delivery in . Will decide in next 1-2 years. Wondering about options for birth control as a bridge to this decision as her Mirena was inserted 5 years ago and is due out in Aug 2021.    Slightly concerned about choosing another method as Mirena has worked well for her, had bad experience (migraines) with OCPs, also not wanting a method that will delay conception if this is their desire. Does not think her partner will be amenable to condom use.    History of sleeve gastrectomy, isolated elevated blood pressures (possibly due to stress / anxiety)    Objective  BP (!) 120/92 (BP Location: Left arm, Cuff Size: Adult Regular)   Wt 106.1 kg (234 lb)   BMI 36.65 kg/m      General: alert, appears stated age, no apparent distress  Pelvic: deferred      KOJO Baker, MARGAUXM    Total time = 20 minutes on the day of service, including chart review, face to face time, entering orders, and documentation.

## 2021-07-03 ENCOUNTER — HEALTH MAINTENANCE LETTER (OUTPATIENT)
Age: 31
End: 2021-07-03

## 2021-10-23 ENCOUNTER — HEALTH MAINTENANCE LETTER (OUTPATIENT)
Age: 31
End: 2021-10-23

## 2021-12-11 ENCOUNTER — OFFICE VISIT (OUTPATIENT)
Dept: URGENT CARE | Facility: URGENT CARE | Age: 31
End: 2021-12-11
Payer: COMMERCIAL

## 2021-12-11 VITALS
HEART RATE: 89 BPM | RESPIRATION RATE: 18 BRPM | OXYGEN SATURATION: 98 % | BODY MASS INDEX: 38.53 KG/M2 | WEIGHT: 246 LBS | DIASTOLIC BLOOD PRESSURE: 74 MMHG | SYSTOLIC BLOOD PRESSURE: 112 MMHG | TEMPERATURE: 97.8 F

## 2021-12-11 DIAGNOSIS — M54.50 ACUTE LEFT-SIDED LOW BACK PAIN WITHOUT SCIATICA: Primary | ICD-10-CM

## 2021-12-11 LAB
ALBUMIN UR-MCNC: NEGATIVE MG/DL
APPEARANCE UR: CLEAR
BACTERIA #/AREA URNS HPF: ABNORMAL /HPF
BILIRUB UR QL STRIP: NEGATIVE
COLOR UR AUTO: YELLOW
GLUCOSE UR STRIP-MCNC: NEGATIVE MG/DL
HGB UR QL STRIP: ABNORMAL
KETONES UR STRIP-MCNC: NEGATIVE MG/DL
LEUKOCYTE ESTERASE UR QL STRIP: NEGATIVE
NITRATE UR QL: NEGATIVE
PH UR STRIP: 5.5 [PH] (ref 5–7)
RBC #/AREA URNS AUTO: ABNORMAL /HPF
SP GR UR STRIP: >=1.03 (ref 1–1.03)
SQUAMOUS #/AREA URNS AUTO: ABNORMAL /LPF
UROBILINOGEN UR STRIP-ACNC: 0.2 E.U./DL
WBC #/AREA URNS AUTO: ABNORMAL /HPF

## 2021-12-11 PROCEDURE — 99213 OFFICE O/P EST LOW 20 MIN: CPT | Performed by: INTERNAL MEDICINE

## 2021-12-11 PROCEDURE — 81001 URINALYSIS AUTO W/SCOPE: CPT | Performed by: INTERNAL MEDICINE

## 2021-12-11 RX ORDER — CYCLOBENZAPRINE HCL 5 MG
5-10 TABLET ORAL 3 TIMES DAILY PRN
Qty: 30 TABLET | Refills: 0 | Status: SHIPPED | OUTPATIENT
Start: 2021-12-11 | End: 2022-12-08

## 2021-12-11 NOTE — PATIENT INSTRUCTIONS
Patient Education     Back Exercises: Lower Back Rotation    To start, lie on your back with your knees bent and feet flat on the floor. Don t press your neck or lower back to the floor. Breathe deeply. You should feel comfortable and relaxed in this position.    Drop both knees to one side. Turn your head to the other side. Keep your shoulders flat on the floor.    Do not push through pain.    Hold for 20 seconds.    Slowly switch sides.    Repeat 2 to 5 times.  [employer] last reviewed this educational content on 3/1/2018    7757-0742 [employer]. All rights reserved. This information is not intended as a substitute for professional medical care. Always follow your healthcare professional's instructions.           Patient Education     Back Exercises: Leg Pull    To start, lie on your back with your knees bent and feet flat on the floor. Don t press your neck or lower back to the floor. Breathe deeply. You should feel comfortable and relaxed in this position.    Pull one knee to your chest.    Hold for 30 to 60 seconds. Return to starting position.    Repeat 2 times.    Switch legs.    For a double leg pull, pull both legs to your chest at the same time. Repeat 2 times.  For your safety, check with your healthcare provider before starting an exercise program.    [employer] last reviewed this educational content on 3/1/2018    8031-6957 [employer]. All rights reserved. This information is not intended as a substitute for professional medical care. Always follow your healthcare professional's instructions.

## 2021-12-11 NOTE — PROGRESS NOTES
"  Assessment & Plan     Acute left-sided low back pain without sciatica  See patient instructions.      Considered differential diagnosis of flank pain radiating to the pelvis including pain of renal origin, ovarian source of pain.  Her physical exam really supports a myofascial source of pain.    - UA macro with reflex to Microscopic and Culture - Clinc Collect  - Urine Microscopic  - cyclobenzaprine (FLEXERIL) 5 MG tablet; Take 1-2 tablets (5-10 mg) by mouth 3 times daily as needed for muscle spasms    Akbar Sow MD  [facility]    Subjective     HPI   Noting pain in the left back for the past 4-5 days which has been getting worse then today some sharp pain in the pelvis on the left side. Pain started out as a dull ache and now is more sharp. Pain with \"driving over bumps.\"  The sharp pain in the pelvis is coming and going but the back pain has been constant.  Denies any prior history of UTI.  Denies dysuria, urgency, frequency or hematuria.  Denies family history of kidney stones.  She typically does not menstruate (has a Mirena IUD) however she had an unexpected (but normal) period last month.  In the past few days she has had more loose stool.      PMH: depression and anxiety  PSH: gastric sleeve, C-sxn         Objective    /74   Pulse 89   Temp 97.8  F (36.6  C) (Tympanic)   Resp 18   Wt 111.6 kg (246 lb)   LMP 11/09/2021 (Exact Date)   SpO2 98%   Breastfeeding No   BMI 38.53 kg/m    Body mass index is 38.53 kg/m .  Physical Exam   GENERAL APPEARANCE: healthy, alert and no distress  RESP: lungs clear to auscultation - no rales, rhonchi or wheezes  CV: regular rates and rhythm, normal S1 S2, no S3 or S4 and no murmur, click or rub  ABDOMEN: soft, nontender, without hepatosplenomegaly or masses and bowel sounds normal  BACK: no CVA tenderness   M/SKEL: there is not bony midline tenderness over the thoracic and lumbar spine; there is tenderness to palpation in " the left lumbar paraspinal muscles; there is pain with  side bending and rotation of the  lumbar spine; Straight Leg Raise is negative on the bilateral  NEURO: Strength:           Hip Flexors 5/5 Right, 5/5 Left -           Hip Extensors 5/5 Right, 5/5 Left -           Knee Flexors 5/5 Right, 5/5 Left -           Knee Extensors 5/5 Right, 5/5 Left -           Plantarflexors 5/5 Right, 5/5 Left -           Dorsiflexors 5/5 Right, 5/5 Left -  Reflexes:          Patellar 1+ Right, 1+ Left          Achilles 1+ Right, 1+ Left  Sensation:         intact throughout     Results for orders placed or performed in visit on 12/11/21 (from the past 24 hour(s))   UA macro with reflex to Microscopic and Culture - Clinc Collect    Specimen: Urine, Midstream   Result Value Ref Range    Color Urine Yellow Colorless, Straw, Light Yellow, Yellow    Appearance Urine Clear Clear    Glucose Urine Negative Negative mg/dL    Bilirubin Urine Negative Negative    Ketones Urine Negative Negative mg/dL    Specific Gravity Urine >=1.030 1.003 - 1.035    Blood Urine Trace (A) Negative    pH Urine 5.5 5.0 - 7.0    Protein Albumin Urine Negative Negative mg/dL    Urobilinogen Urine 0.2 0.2, 1.0 E.U./dL    Nitrite Urine Negative Negative    Leukocyte Esterase Urine Negative Negative   Urine Microscopic   Result Value Ref Range    Bacteria Urine None Seen None Seen /HPF    RBC Urine 0-2 0-2 /HPF /HPF    WBC Urine 0-5 0-5 /HPF /HPF    Squamous Epithelials Urine Few (A) None Seen /LPF    Narrative    Urine Culture not indicated

## 2021-12-18 ENCOUNTER — HEALTH MAINTENANCE LETTER (OUTPATIENT)
Age: 31
End: 2021-12-18

## 2021-12-28 ENCOUNTER — OFFICE VISIT (OUTPATIENT)
Dept: FAMILY MEDICINE | Facility: CLINIC | Age: 31
End: 2021-12-28
Payer: COMMERCIAL

## 2021-12-28 VITALS
BODY MASS INDEX: 40.02 KG/M2 | HEART RATE: 86 BPM | DIASTOLIC BLOOD PRESSURE: 81 MMHG | WEIGHT: 249 LBS | HEIGHT: 66 IN | SYSTOLIC BLOOD PRESSURE: 129 MMHG

## 2021-12-28 DIAGNOSIS — J02.9 ACUTE PHARYNGITIS, UNSPECIFIED ETIOLOGY: Primary | ICD-10-CM

## 2021-12-28 LAB
DEPRECATED S PYO AG THROAT QL EIA: NEGATIVE
GROUP A STREP BY PCR: NOT DETECTED

## 2021-12-28 PROCEDURE — 87591 N.GONORRHOEAE DNA AMP PROB: CPT | Performed by: PHYSICIAN ASSISTANT

## 2021-12-28 PROCEDURE — 87651 STREP A DNA AMP PROBE: CPT | Performed by: PHYSICIAN ASSISTANT

## 2021-12-28 PROCEDURE — 99213 OFFICE O/P EST LOW 20 MIN: CPT | Performed by: PHYSICIAN ASSISTANT

## 2021-12-28 PROCEDURE — 87491 CHLMYD TRACH DNA AMP PROBE: CPT | Performed by: PHYSICIAN ASSISTANT

## 2021-12-28 ASSESSMENT — ANXIETY QUESTIONNAIRES
IF YOU CHECKED OFF ANY PROBLEMS ON THIS QUESTIONNAIRE, HOW DIFFICULT HAVE THESE PROBLEMS MADE IT FOR YOU TO DO YOUR WORK, TAKE CARE OF THINGS AT HOME, OR GET ALONG WITH OTHER PEOPLE: SOMEWHAT DIFFICULT
1. FEELING NERVOUS, ANXIOUS, OR ON EDGE: SEVERAL DAYS
GAD7 TOTAL SCORE: 8
5. BEING SO RESTLESS THAT IT IS HARD TO SIT STILL: SEVERAL DAYS
2. NOT BEING ABLE TO STOP OR CONTROL WORRYING: SEVERAL DAYS
3. WORRYING TOO MUCH ABOUT DIFFERENT THINGS: SEVERAL DAYS
6. BECOMING EASILY ANNOYED OR IRRITABLE: SEVERAL DAYS
7. FEELING AFRAID AS IF SOMETHING AWFUL MIGHT HAPPEN: SEVERAL DAYS

## 2021-12-28 ASSESSMENT — PATIENT HEALTH QUESTIONNAIRE - PHQ9
SUM OF ALL RESPONSES TO PHQ QUESTIONS 1-9: 6
5. POOR APPETITE OR OVEREATING: MORE THAN HALF THE DAYS

## 2021-12-28 ASSESSMENT — MIFFLIN-ST. JEOR: SCORE: 1859.08

## 2021-12-28 NOTE — PROGRESS NOTES
"  Subjective:    Isabell Maza is a 31 year old female who presents with chief complaint of throat.  She has had a sore throat for over a week.  It is getting a little better.  She had a negative Covid test at home.  Has not really been tested for anything else.  No known sick contacts.  No fevers.  No other allergy type symptoms.  She has done some reading online, and is concerned that she may have gonorrhea chlamydia infection in her throat.  She has had unprotected oral sex with her partner.  Used condoms for vaginal intercourse.    Patient Active Problem List   Diagnosis     Anxiety     Acute blood loss anemia     Bariatric surgery status     Dysmenorrhea     Excessive daytime sleepiness     Fever of unknown origin following delivery, postpartum     Migraine headache with aura     Morbid obesity with BMI of 40.0-44.9, adult (H)     Non-rheumatic mitral regurgitation     Presence of intrauterine contraceptive device (IUD)     Pulmonary hypertension (H)     Elevated BP without diagnosis of hypertension       Current Outpatient Medications:      ALPRAZolam (XANAX) 0.25 MG tablet, TAKE 1 TABLET BY MOUTH DAILY AS NEEDED, Disp: , Rfl:      buPROPion (WELLBUTRIN XL) 150 MG 24 hr tablet, Take 150 mg by mouth every morning, Disp: , Rfl:      cyclobenzaprine (FLEXERIL) 5 MG tablet, Take 1-2 tablets (5-10 mg) by mouth 3 times daily as needed for muscle spasms, Disp: 30 tablet, Rfl: 0     escitalopram (LEXAPRO) 20 MG tablet, Take 20 mg by mouth daily, Disp: , Rfl:      levonorgestrel (MIRENA) 20 MCG/24HR IUD, 1 Device by Intrauterine route, Disp: , Rfl:       Objective:   Allergies:  Patient has no known allergies.    Vitals:  Vitals:    12/28/21 1123   BP: 129/81   BP Location: Left arm   Patient Position: Sitting   Cuff Size: Adult Regular   Pulse: 86   Weight: 112.9 kg (249 lb)   Height: 1.689 m (5' 6.5\")       Body mass index is 39.59 kg/m .    Vital signs reviewed.  General: Patient is alert and oriented x 3, in no " apparent distress  Ears: TMs are non-erythematous with good light reflex bilaterally  Throat: no erythema, edema or exudate noted  Lymphatic: no anterior cervical lymph node enlargement  Cardiac: regular rate and rhythm, no murmurs  Pulmonary: lungs clear to auscultation bilaterally, no crackles, rales, rhonchi, or wheezing noted    Results for orders placed or performed in visit on 12/28/21   Streptococcus A Rapid Screen w/Reflex to PCR     Status: Normal    Specimen: Throat; Swab   Result Value Ref Range    Group A Strep antigen Negative Negative     Other labs pending.    Assessment and Plan:   1. Acute/chronic pharyngitis, unspecified etiology  Has had a sore throat for over a week.  It is improving somewhat.  No known sick contacts.  She tested herself negative for Covid.  We discussed checking for strep and she wants to do that.  I will follow up with results.  She is also concerned that she may have been exposed to an STD, and that is what is causing her throat symptoms.  I reviewed this is very unlikely.  .  I will follow up with throat swab for gonorrhea and chlamydia.  She declines vaginal/cervical STD testing.  We reviewed safe sex practices today.  - Streptococcus A Rapid Screen w/Reflex to PCR  - Chlamydia trachomatis/Neisseria gonorrhoeae by PCR  - Group A Streptococcus PCR Throat Swab       This dictation uses voice recognition software, which may contain typographical errors.

## 2021-12-29 LAB
C TRACH DNA SPEC QL PROBE+SIG AMP: NEGATIVE
N GONORRHOEA DNA SPEC QL NAA+PROBE: NEGATIVE

## 2021-12-29 ASSESSMENT — ANXIETY QUESTIONNAIRES: GAD7 TOTAL SCORE: 8

## 2022-04-14 ENCOUNTER — OFFICE VISIT (OUTPATIENT)
Dept: URGENT CARE | Facility: URGENT CARE | Age: 32
End: 2022-04-14
Payer: COMMERCIAL

## 2022-04-14 VITALS
RESPIRATION RATE: 16 BRPM | TEMPERATURE: 98.7 F | OXYGEN SATURATION: 99 % | BODY MASS INDEX: 40.15 KG/M2 | DIASTOLIC BLOOD PRESSURE: 78 MMHG | SYSTOLIC BLOOD PRESSURE: 118 MMHG | WEIGHT: 252.5 LBS | HEART RATE: 91 BPM

## 2022-04-14 DIAGNOSIS — M54.42 MIDLINE LOW BACK PAIN WITH LEFT-SIDED SCIATICA, UNSPECIFIED CHRONICITY: Primary | ICD-10-CM

## 2022-04-14 PROCEDURE — 99214 OFFICE O/P EST MOD 30 MIN: CPT | Performed by: FAMILY MEDICINE

## 2022-04-14 RX ORDER — CYCLOBENZAPRINE HCL 5 MG
5-10 TABLET ORAL EVERY 8 HOURS PRN
Qty: 30 TABLET | Refills: 0 | Status: SHIPPED | OUTPATIENT
Start: 2022-04-14 | End: 2022-12-08

## 2022-04-14 RX ORDER — METHYLPREDNISOLONE 4 MG
TABLET, DOSE PACK ORAL
Qty: 21 TABLET | Refills: 0 | Status: SHIPPED | OUTPATIENT
Start: 2022-04-14 | End: 2022-04-26

## 2022-04-14 NOTE — PROGRESS NOTES
SUBJECTIVE:  Chief Complaint   Patient presents with     Back Pain     4 days, radiating down both legs, alternation between ibuprofen and tylenol     Isabell Maza is a 32 year old female who presents with a chief complaint of back pain.  Will get pins and needles when sitting.  Worse with position change.  Symptoms began 4 day(s) ago, are moderate and sudden onset and worsening  Context:  Injury:No.  How: usually radiates on left leg but this morning is now on right also.   Pain exacerbated by movement/position change. Relieved by nothing.  She treated it initially with ice, heat, Tylenol and Ibuprofen.    Given flexeril back in December, this felt more like a pulled muscle/spasm.     No loss of bowel or bladder control, no weakness    Past Medical History:   Diagnosis Date     Anxiety      Depression      Current Outpatient Medications   Medication Sig Dispense Refill     ALPRAZolam (XANAX) 0.25 MG tablet TAKE 1 TABLET BY MOUTH DAILY AS NEEDED       buPROPion (WELLBUTRIN XL) 150 MG 24 hr tablet Take 150 mg by mouth every morning       escitalopram (LEXAPRO) 20 MG tablet Take 20 mg by mouth daily       levonorgestrel (MIRENA) 20 MCG/24HR IUD 1 Device by Intrauterine route       cyclobenzaprine (FLEXERIL) 5 MG tablet Take 1-2 tablets (5-10 mg) by mouth 3 times daily as needed for muscle spasms (Patient not taking: Reported on 4/14/2022) 30 tablet 0     Social History     Tobacco Use     Smoking status: Never Smoker     Smokeless tobacco: Never Used   Substance Use Topics     Alcohol use: Yes       ROS:  Review of systems negative except as stated above.    EXAM:   /78 (BP Location: Right arm, Patient Position: Sitting, Cuff Size: Adult Large)   Pulse 91   Temp 98.7  F (37.1  C) (Tympanic)   Resp 16   Wt 114.5 kg (252 lb 8 oz)   SpO2 99%   BMI 40.15 kg/m    M/S Exam:back tenderness to palpation right SI and paraspinal and decreased ROM  GENERAL APPEARANCE: healthy, alert and no distress  EXTREMITIES:  peripheral pulses normal  SKIN: no suspicious lesions or rashes  PSYCH: alert, affect bright    X-RAY - lumbar spine - no acute fracture, loss of disc space L5-S1 personally viewed by me      ASSESSMENT/PLAN:  (M54.42) Midline low back pain with left-sided sciatica, unspecified chronicity  (primary encounter diagnosis)  Plan: XR Lumbar Spine 2/3 Views, methylPREDNISolone         (MEDROL DOSEPAK) 4 MG tablet therapy pack,         cyclobenzaprine (FLEXERIL) 5 MG tablet            Reviewed symptomatic treatment with tylenol, ibuprofen, rest, ice/heat as needed.  RX Medrol dospak and RX flexeril given for treatment.  Will follow up on formal Xray report and notify if any abnormalities.    Follow up with primary provider if no improvement of symptoms in 1-2 weeks    Delroy Bernstein MD  April 14, 2022 2:26 PM

## 2022-04-14 NOTE — PATIENT INSTRUCTIONS
Okay to take ibuprofen 200 mg - 4 tablets (800 mg) every 8 hours as needed.  Okay to take tylenol 500 mg - 2 tablets (1000 mg) every 6-8 hours as needed, do not exceed 3000 mg in 24 hours.  Okay to take flexeril 5 mg - 1 to 2 tablets every 8 hours as needed  Take full course of steroid - Medrol dospak    Ice and/or heat

## 2022-04-26 ENCOUNTER — OFFICE VISIT (OUTPATIENT)
Dept: FAMILY MEDICINE | Facility: CLINIC | Age: 32
End: 2022-04-26
Payer: COMMERCIAL

## 2022-04-26 VITALS
HEART RATE: 94 BPM | HEIGHT: 67 IN | RESPIRATION RATE: 12 BRPM | WEIGHT: 255.25 LBS | TEMPERATURE: 98.6 F | OXYGEN SATURATION: 98 % | SYSTOLIC BLOOD PRESSURE: 110 MMHG | BODY MASS INDEX: 40.06 KG/M2 | DIASTOLIC BLOOD PRESSURE: 80 MMHG

## 2022-04-26 DIAGNOSIS — K21.00 GASTROESOPHAGEAL REFLUX DISEASE WITH ESOPHAGITIS WITHOUT HEMORRHAGE: Primary | ICD-10-CM

## 2022-04-26 DIAGNOSIS — G57.00 PIRIFORMIS SYNDROME, UNSPECIFIED LATERALITY: ICD-10-CM

## 2022-04-26 DIAGNOSIS — K92.1 BLACK TARRY STOOLS: ICD-10-CM

## 2022-04-26 DIAGNOSIS — J02.9 SORE THROAT: ICD-10-CM

## 2022-04-26 DIAGNOSIS — K44.9 HIATAL HERNIA: ICD-10-CM

## 2022-04-26 DIAGNOSIS — Z98.84 BARIATRIC SURGERY STATUS: ICD-10-CM

## 2022-04-26 LAB — DEPRECATED S PYO AG THROAT QL EIA: NEGATIVE

## 2022-04-26 PROCEDURE — 87651 STREP A DNA AMP PROBE: CPT | Performed by: FAMILY MEDICINE

## 2022-04-26 PROCEDURE — 99214 OFFICE O/P EST MOD 30 MIN: CPT | Performed by: FAMILY MEDICINE

## 2022-04-26 RX ORDER — OMEPRAZOLE 40 MG/1
40 CAPSULE, DELAYED RELEASE ORAL DAILY
Qty: 90 CAPSULE | Refills: 3 | Status: SHIPPED | OUTPATIENT
Start: 2022-04-26 | End: 2023-03-29

## 2022-04-26 NOTE — PROGRESS NOTES
ASSESSMENT/PLAN:   Isabell was seen today for follow up .    Diagnoses and all orders for this visit:    Gastroesophageal reflux disease with esophagitis without hemorrhage  I reviewed that if her sx persist, she might need an egd.  Reviewed risk of prolonged gerd.  Increase omeprazole to 40mg po daily  Reviewed healthy diet.    Refer to gi.    -     Helicobacter pylori Antigen Stool; Future  -     omeprazole (PRILOSEC) 40 MG DR capsule; Take 1 capsule (40 mg) by mouth daily  -     Adult Gastro Ref - Consult Only; Future    Cervical cancer screening not needed at this time.  She is up-to-date.    Bariatric surgery status    Black tarry stools   Refer to GI  -     Helicobacter pylori Antigen Stool; Future  -     omeprazole (PRILOSEC) 40 MG DR capsule; Take 1 capsule (40 mg) by mouth daily  -     Adult Gastro Ref - Consult Only; Future    Hiatal hernia   We discussed that this may be contributing to her persistent GERD  -     Helicobacter pylori Antigen Stool; Future  -     omeprazole (PRILOSEC) 40 MG DR capsule; Take 1 capsule (40 mg) by mouth daily  -     Adult Gastro Ref - Consult Only; Future    Piriformis syndrome, unspecified laterality   Stretches and exercises were given today.  If these do not improve her symptoms, I can refer her to physical therapy.    Sore throat  -     Streptococcus A Rapid Screen w/Reflex to PCR - Clinic Collect  -     Group A Streptococcus PCR Throat Swab   Reviewed supportive care.  If her PCR comes back positive, will treat with antibiotics.  Reviewed signs and symptoms for which she should be seen again including trismus, high fever, difficulty with turning her neck.    Did discuss that if she gets recurrent neurologic changes in her upper or lower extremities, she should be seen again.  At that time consider imaging.      No follow-ups on file.       ======================================================    SUBJECTIVE  Isabell Maza is a 32 year old female here for   1.  Back  "pain:  On again and off again.  Mostly on the left lower back.  This last time she had shooting pains down her bialteral legs and her left arm.    It has been ongoing since age 23.  The nerve issues are new.  She felt like she had an electrical pain in her arms.  No loss of function .   She had an xray at urgent care.  No h/o MRI or CT scan.    No fam hx of neurologic disease.    She has not done PT for her back.  When she is in her good exercising routine, she has more soreness.  She finished a medrol dose pack.        2.  Throat issues since Friday.  Happens intermittently and she feels like she is getting sick and then it just goes away.  Has trouble opening her mouth all the way.  She feels her ears are popping and her throat hurts.  It hurts to touch it on the outside and on the inside.she can turn her head ok.  No fevers with it.  It happens when she feels like she is getting a cold.    She has some acid reflux issues.  She takes prilosec when it is really bad.   gerd worsening x 1 month.    Mom as reflux.    She tries to limit spice, as she notes that it makes it worse.  She limits coffee.  She does not regularly drink pop or juice.  She has tomato with her salad.  She has had black tarry stools.       Bariatric surgery in 2019.  She had a gastric sleeve procedure.   Known hiatal hernia.    She still has her tonsils.      Had hep c and hiv tests in December.      Had one child.  Had h/o hypertension, and chorioamnionitis.  This was a .      She feels safe and comfortable in her life .       UTD on pap smear, which she had after the birth of her last child.  She was over 27 yo.  No history of abnormal pap smears.      ROS  Complete 10 point review of systems negative except as noted above in HPI      OBJECTIVE  /80 (BP Location: Left arm, Patient Position: Sitting, Cuff Size: Adult Regular)   Pulse 94   Temp 98.6  F (37  C) (Oral)   Resp 12   Ht 1.689 m (5' 6.5\")   Wt 115.8 kg (255 lb 4 " oz)   SpO2 98%   BMI 40.58 kg/m     Gen: no acute distress  Neck:  Supple, no lad, no carotid bruits  Heart:  Regular rate and rhythm.  No m/r/g  Lungs: cta bilaterally, no wheezes or rhonchi.  Good air inspiration  Abdomen:  No masses or organomegaly  Extremities:  No edema.     Bilateral straight leg raise is negative.  Full range of motion of bilateral hips.  No tenderness along the spine.  She does have tenderness particularly over the left piriformis.  Palpation of this reproduces her pain.  Sensation is intact over her bilateral lower extremities.  Deep tendon reflexes are symmetric.  Sensation is intact over her upper arms.  Strength is 5 out of 5 in her upper and lower extremities.  Heent:  pharnyx is beefy red.  Bilateral tonsils are symmetric.  No purulence noted.    No trismus noted  Full rom in neck.    No thyromegaly or nodules.    Current Outpatient Medications   Medication     ALPRAZolam (XANAX) 0.25 MG tablet     buPROPion (WELLBUTRIN XL) 150 MG 24 hr tablet     cyclobenzaprine (FLEXERIL) 5 MG tablet     escitalopram (LEXAPRO) 20 MG tablet     levonorgestrel (MIRENA) 20 MCG/24HR IUD     omeprazole (PRILOSEC) 40 MG DR capsule     cyclobenzaprine (FLEXERIL) 5 MG tablet     No current facility-administered medications for this visit.      Patient Active Problem List   Diagnosis     Anxiety     Acute blood loss anemia     Bariatric surgery status     Dysmenorrhea     Excessive daytime sleepiness     Fever of unknown origin following delivery, postpartum     Migraine headache with aura     Morbid obesity with BMI of 40.0-44.9, adult (H)     Non-rheumatic mitral regurgitation     Presence of intrauterine contraceptive device (IUD)     Pulmonary hypertension (H)     Elevated BP without diagnosis of hypertension        LABS & IMAGES   Results for orders placed or performed in visit on 04/26/22   Streptococcus A Rapid Screen w/Reflex to PCR - Clinic Collect     Status: Normal    Specimen: Throat; Swab    Result Value Ref Range    Group A Strep antigen Negative Negative         ======================================================    MDM          Options for treatment and follow-up care were reviewed with the patient. Isabell Maza and/or guardian was engaged and actively involved in the decision making process. Isabell Maza and/or guardian verbalized understanding of the options discussed and was satisfied with the final plan.      Jennifer Ramírez MD

## 2022-04-27 ENCOUNTER — LAB (OUTPATIENT)
Dept: LAB | Facility: CLINIC | Age: 32
End: 2022-04-27
Payer: COMMERCIAL

## 2022-04-27 DIAGNOSIS — K44.9 HIATAL HERNIA: ICD-10-CM

## 2022-04-27 DIAGNOSIS — K92.1 BLACK TARRY STOOLS: ICD-10-CM

## 2022-04-27 DIAGNOSIS — K21.00 GASTROESOPHAGEAL REFLUX DISEASE WITH ESOPHAGITIS WITHOUT HEMORRHAGE: ICD-10-CM

## 2022-04-27 LAB — GROUP A STREP BY PCR: NOT DETECTED

## 2022-04-27 PROCEDURE — 87338 HPYLORI STOOL AG IA: CPT

## 2022-04-29 LAB — H PYLORI AG STL QL IA: NEGATIVE

## 2022-07-18 NOTE — TELEPHONE ENCOUNTER
REFERRAL INFORMATION:    Referring Provider:  Dr. Jennifer Ramírez    Referring Clinic:  [facility]     Reason for Visit/Diagnosis: Black tarry stools, Hiatal hernia      FUTURE VISIT INFORMATION:    Appointment Date: 9/13/2022    Appointment Time: 1:20 PM      NOTES STATUS DETAILS   OFFICE NOTE from Referring Provider Internal 4/26/2022 Office visit with Dr. Ramírez     OFFICE NOTE from Other Specialist N/A    HOSPITAL DISCHARGE SUMMARY/  ED VISITS Care Everywhere 11/7/19 ([facility])    OPERATIVE REPORT N/A    MEDICATION LIST Internal         ENDOSCOPY  N/A    COLONOSCOPY N/A    ERCP N/A    EUS N/A    STOOL TESTING Internal 4/27/2022   PERTINENT LABS Internal/ Care Everywhere    PATHOLOGY REPORTS (RELATED) N/A    IMAGING (CT, MRI, EGD, MRCP, Small Bowel Follow Through/SBT, MR/CT Enterography) N/A

## 2022-07-30 ENCOUNTER — HEALTH MAINTENANCE LETTER (OUTPATIENT)
Age: 32
End: 2022-07-30

## 2022-08-17 ENCOUNTER — TELEPHONE (OUTPATIENT)
Dept: GASTROENTEROLOGY | Facility: CLINIC | Age: 32
End: 2022-08-17

## 2022-08-24 NOTE — TELEPHONE ENCOUNTER
LVMx2 regarding 9/13 appt being cancelled d/t provider no longer available that date. R/s to next avail with Dr. Mosqueda or any other provider for dx.

## 2022-09-13 ENCOUNTER — PRE VISIT (OUTPATIENT)
Dept: GASTROENTEROLOGY | Facility: CLINIC | Age: 32
End: 2022-09-13

## 2022-10-10 ENCOUNTER — HEALTH MAINTENANCE LETTER (OUTPATIENT)
Age: 32
End: 2022-10-10

## 2022-11-15 ENCOUNTER — OFFICE VISIT (OUTPATIENT)
Dept: URGENT CARE | Facility: URGENT CARE | Age: 32
End: 2022-11-15
Payer: COMMERCIAL

## 2022-11-15 VITALS
DIASTOLIC BLOOD PRESSURE: 73 MMHG | SYSTOLIC BLOOD PRESSURE: 107 MMHG | HEART RATE: 114 BPM | TEMPERATURE: 97.7 F | RESPIRATION RATE: 16 BRPM | OXYGEN SATURATION: 98 %

## 2022-11-15 DIAGNOSIS — J98.8 WHEEZING-ASSOCIATED RESPIRATORY INFECTION (WARI): Primary | ICD-10-CM

## 2022-11-15 DIAGNOSIS — R05.1 ACUTE COUGH: ICD-10-CM

## 2022-11-15 DIAGNOSIS — R50.9 FEVER IN ADULT: ICD-10-CM

## 2022-11-15 DIAGNOSIS — R07.0 THROAT PAIN: ICD-10-CM

## 2022-11-15 LAB — DEPRECATED S PYO AG THROAT QL EIA: NEGATIVE

## 2022-11-15 PROCEDURE — 87651 STREP A DNA AMP PROBE: CPT | Performed by: PHYSICIAN ASSISTANT

## 2022-11-15 PROCEDURE — 99214 OFFICE O/P EST MOD 30 MIN: CPT | Mod: CS | Performed by: PHYSICIAN ASSISTANT

## 2022-11-15 PROCEDURE — U0005 INFEC AGEN DETEC AMPLI PROBE: HCPCS | Performed by: PHYSICIAN ASSISTANT

## 2022-11-15 PROCEDURE — U0003 INFECTIOUS AGENT DETECTION BY NUCLEIC ACID (DNA OR RNA); SEVERE ACUTE RESPIRATORY SYNDROME CORONAVIRUS 2 (SARS-COV-2) (CORONAVIRUS DISEASE [COVID-19]), AMPLIFIED PROBE TECHNIQUE, MAKING USE OF HIGH THROUGHPUT TECHNOLOGIES AS DESCRIBED BY CMS-2020-01-R: HCPCS | Performed by: PHYSICIAN ASSISTANT

## 2022-11-15 RX ORDER — ALBUTEROL SULFATE 90 UG/1
2 AEROSOL, METERED RESPIRATORY (INHALATION) EVERY 6 HOURS PRN
Qty: 18 G | Refills: 0 | Status: SHIPPED | OUTPATIENT
Start: 2022-11-15 | End: 2022-12-08

## 2022-11-15 RX ORDER — AZITHROMYCIN 250 MG/1
TABLET, FILM COATED ORAL
Qty: 6 TABLET | Refills: 0 | Status: SHIPPED | OUTPATIENT
Start: 2022-11-15 | End: 2022-11-20

## 2022-11-15 RX ORDER — BENZONATATE 200 MG/1
200 CAPSULE ORAL 3 TIMES DAILY PRN
Qty: 30 CAPSULE | Refills: 0 | Status: SHIPPED | OUTPATIENT
Start: 2022-11-15 | End: 2022-11-25

## 2022-11-15 NOTE — PROGRESS NOTES
ASSESSMENT/PLAN:    (J98.8) Wheezing-associated respiratory infection (WARI)  (primary encounter diagnosis)    MDM: Recent Covid-19 infection. Prolonged URI with interval worsening and associated reactive airway component. Favor secondary bacterial infection. Occult pneumonia is in differential. No severe respiratory distress requiring further ER or hospital inpatient management now. Treatment plan as per below. I have advised low threshold for medical follow-up if symptoms fail to resolve with treatment provided here today, and immediately if symptoms worsen. I have also advised chest x-ray if no improvement following several days of treatment provided here today, if any worsening or if respiratory symptoms fail to fully resolve in the next 10 days. Urgent and emergent follow-up criteria are also reviewed. Please see below patient discharge summary.     Plan: azithromycin (ZITHROMAX) 250 MG tablet,         benzonatate (TESSALON) 200 MG capsule,         albuterol (PROAIR HFA/PROVENTIL HFA/VENTOLIN         HFA) 108 (90 Base) MCG/ACT inhaler              November 15, 2022  [facility]  Care Plan:       1. Start the Z-Pack antibiotic for your lung infection    2. Use your Albuterol inhaler every 4-6 hours as needed     3. Follow-up with your primary care provider or urgent care if your symptoms fail to improve after 5 days of treatment provided here today sooner if any sudden, severe, worsening.     4. If you cough is not gone  in 10 days, see your primary care provider for chest x-ray and further evaluation.     Go to the EMERGENCY ROOM if you develop the below:       Coughing up blood    Worsening weakness, drowsiness, headache, or stiff neck    Increased wheezing not helped with medication, shortness of breath, or pain with breathing      (R07.0) Throat pain  Plan: Streptococcus A Rapid Screen w/Reflex to PCR -         Clinic Collect, Symptomatic; Unknown COVID-19         Virus (Coronavirus) by PCR Nasopharyngeal,          Group A Streptococcus PCR Throat Swab      (R05.1) Acute cough      (R50.9) Fever in adult    ------------------    SUBJECTIVE:    Isabell Maza presents to urgent care today for evaluation of prolonged cough. Patient reports she was diagnosed with Covid Mid-October (approximately 1 month ago). Approximately 1.5 weeks after Covid symptoms improved, patient developed increased cough, sore throat and waxing and waning fever. Cough  has now worsened and is associated with some intermittent wheezing/states  lungs hurt when coughing.       Illness Contact: son developed similar symptoms 2 days          RESP HX:  No prior history of hospitalization for respiratory distress. No formal diagnosis of asthma or COPD.                ROS: Positive for temperature 100-101 on/off for past week. No high fever or shaking chills. No associated severe cough,coughing up of blood, blue lips/fingers/toes, or severe shortness of breath (confirms they are still able to to all self cares and activities of daily living). No acute fainting, severe chest pain, racing or irregular heartbeats. No acute onset abdominal pain, nausea, vomiting, or cyclical diarrhea (but has had some non-bloody loose stools). No severe body aches, severe headaches, rashes, hives, joint swelling redness or other acute illness symptoms.     Past Medical History:   Diagnosis Date     Anxiety      Depression        Patient Active Problem List   Diagnosis     Anxiety     Acute blood loss anemia     Bariatric surgery status     Dysmenorrhea     Excessive daytime sleepiness     Fever of unknown origin following delivery, postpartum     Migraine headache with aura     Morbid obesity with BMI of 40.0-44.9, adult (H)     Non-rheumatic mitral regurgitation     Presence of intrauterine contraceptive device (IUD)     Pulmonary hypertension (H)     Elevated BP without diagnosis of hypertension       Current Outpatient Medications   Medication     ALPRAZolam (XANAX)  0.25 MG tablet     buPROPion (WELLBUTRIN XL) 150 MG 24 hr tablet     cyclobenzaprine (FLEXERIL) 5 MG tablet     cyclobenzaprine (FLEXERIL) 5 MG tablet     escitalopram (LEXAPRO) 20 MG tablet     levonorgestrel (MIRENA) 20 MCG/24HR IUD     omeprazole (PRILOSEC) 40 MG DR capsule     No current facility-administered medications for this visit.       No Known Allergies      OBJECTIVE:  /73   Pulse 114   Temp 97.7  F (36.5  C)   Resp 16   SpO2 98%     General appearance: alert and no apparent distress  Skin color is uniform in color and without rash.  HEENT:   Conjunctiva not injected.  Sclera clear.  Left TM is normal: no effusions, no erythema, and normal landmarks.  Right TM is normal: no effusions, no erythema, and normal landmarks.  Nasal mucosa is Congested  Oropharyngeal exam is normal: no lesions, erythema, adenopathy or exudate.  NECK: Trachea is midline. Neck is supple, FROM with no adenopathy  CARDIAC:NORMAL - regular rate and rhythm without murmur.  RESP: No increased work of breathing at rest--able to speak full sentences without pause. Positive for scattered rhonchi and faint wheezes (priimarily with with forced expiration/coughing). No rales. Still moving air well into all listening areas today.   NEURO: Alert and oriented.  Normal speech and mentation.  CN II/XII grossly intact.  Gait within normal limits.      Results for orders placed or performed in visit on 11/15/22   Streptococcus A Rapid Screen w/Reflex to PCR - Clinic Collect     Status: Normal    Specimen: Throat; Swab   Result Value Ref Range    Group A Strep antigen Negative Negative         CHEST X-RAY: The option of chest ray is discussed/offered, but patient elected to forego x-ray today in favor of treatment and will follow-up if any worsening or non-resolution after treatment provided here today.

## 2022-11-16 LAB
GROUP A STREP BY PCR: NOT DETECTED
SARS-COV-2 RNA RESP QL NAA+PROBE: NEGATIVE

## 2022-11-16 NOTE — PATIENT INSTRUCTIONS
November 15, 2022  [facility]  Care Plan:       1. Start the Z-Pack antibiotic for your lung infection    2. Use your Albuterol inhaler every 4-6 hours as needed     3. Follow-up with your primary care provider or urgent care if your symptoms fail to improve after 5 days of treatment provided here today sooner if any sudden, severe, worsening.     4. If you cough is not gone  in 10 days, see your primary care provider for chest x-ray and further evaluation.     Go to the EMERGENCY ROOM if you develop the below:     Coughing up blood  Worsening weakness, drowsiness, headache, or stiff neck  Increased wheezing not helped with medication, shortness of breath, or pain with breathing

## 2022-12-04 ENCOUNTER — NURSE TRIAGE (OUTPATIENT)
Dept: NURSING | Facility: CLINIC | Age: 32
End: 2022-12-04

## 2022-12-04 NOTE — TELEPHONE ENCOUNTER
Pt states that yesterday stating having LBP on left sciatic area that radiates down left leg. No injury noted. Pt having pain, numbness and tingling down left leg. Has tried ice, heat, and OTC pain meds w/o relief. Pt had a similar episode back in April and was prescribed Medrol dospak and Flexeril which was helpful. Pt states that this is the same type of pain and is inquiring as to if she would be able to get these meds w/o going in to UC. Checked with sched and there are no available virtual UC visits today. Advised pt present to UC to be evaluated. Pt agreeable to plan.    Flakita Tavares, RN, BSN  [facility] Nurse Advisor 9:20 AM 12/4/2022      Reason for Disposition    [1] SEVERE back pain (e.g., excruciating, unable to do any normal activities) AND [2] not improved 2 hours after pain medicine    Additional Information    Negative: Passed out (i.e., lost consciousness, collapsed and was not responding)    Negative: Shock suspected (e.g., cold/pale/clammy skin, too weak to stand, low BP, rapid pulse)    Negative: Sounds like a life-threatening emergency to the triager    Negative: Major injury to the back (e.g., MVA, fall > 10 feet or 3 meters, penetrating injury, etc.)    Negative: Followed a tailbone injury    Negative: [1] Pain in the upper back over the ribs (rib cage) AND [2] radiates (travels, goes) into chest    Negative: [1] Pain in the upper back over the ribs (rib cage) AND [2] worsened by coughing (or clearly increases with breathing)    Negative: Back pain during pregnancy    Negative: Pain mainly in flank (i.e., in the side, over the lower ribs or just below the ribs)    Negative: [1] SEVERE back pain (e.g., excruciating) AND [2] sudden onset AND [3] age > 60 years    Negative: [1] Unable to urinate (or only a few drops) > 4 hours AND [2] bladder feels very full (e.g., palpable bladder or strong urge to urinate)    Negative: [1] Loss of bladder or bowel control (urine or bowel incontinence;  wetting self, leaking stool) AND [2] new-onset    Negative: Numbness in groin or rectal area (i.e., loss of sensation)    Negative: [1] SEVERE abdominal pain AND [2] present > 1 hour    Negative: [1] Abdominal pain AND [2] age > 60 years    Negative: Weakness of a leg or foot (e.g., unable to bear weight, dragging foot)    Negative: Unable to walk    Negative: Patient sounds very sick or weak to the triager    Protocols used: BACK PAIN-A-AH

## 2022-12-08 ENCOUNTER — VIRTUAL VISIT (OUTPATIENT)
Dept: FAMILY MEDICINE | Facility: CLINIC | Age: 32
End: 2022-12-08
Payer: COMMERCIAL

## 2022-12-08 DIAGNOSIS — M54.42 MIDLINE LOW BACK PAIN WITH LEFT-SIDED SCIATICA, UNSPECIFIED CHRONICITY: ICD-10-CM

## 2022-12-08 PROCEDURE — 99214 OFFICE O/P EST MOD 30 MIN: CPT | Mod: GT | Performed by: FAMILY MEDICINE

## 2022-12-08 RX ORDER — DICLOFENAC SODIUM 75 MG/1
75 TABLET, DELAYED RELEASE ORAL 2 TIMES DAILY PRN
Qty: 30 TABLET | Refills: 0 | Status: SHIPPED | OUTPATIENT
Start: 2022-12-08 | End: 2023-03-29

## 2022-12-08 RX ORDER — CYCLOBENZAPRINE HCL 5 MG
5-10 TABLET ORAL EVERY 8 HOURS PRN
Qty: 30 TABLET | Refills: 0 | Status: SHIPPED | OUTPATIENT
Start: 2022-12-08 | End: 2024-02-13

## 2022-12-08 ASSESSMENT — PATIENT HEALTH QUESTIONNAIRE - PHQ9
10. IF YOU CHECKED OFF ANY PROBLEMS, HOW DIFFICULT HAVE THESE PROBLEMS MADE IT FOR YOU TO DO YOUR WORK, TAKE CARE OF THINGS AT HOME, OR GET ALONG WITH OTHER PEOPLE: SOMEWHAT DIFFICULT
SUM OF ALL RESPONSES TO PHQ QUESTIONS 1-9: 5
SUM OF ALL RESPONSES TO PHQ QUESTIONS 1-9: 5

## 2022-12-08 NOTE — PROGRESS NOTES
Isabell is a 32 year old who is being evaluated via a billable video visit.      How would you like to obtain your AVS? MyChart  If the video visit is dropped, the invitation should be resent by: Text to cell phone: [phone]  Will anyone else be joining your video visit? No        Assessment & Plan     Midline low back pain with left-sided sciatica, unspecified chronicity, recurrent.   - cyclobenzaprine (FLEXERIL) 5 MG tablet  Dispense: 30 tablet; Refill: 0  - diclofenac (VOLTAREN) 75 MG EC tablet  Dispense: 30 tablet; Refill: 0      Patient education and Handout with home care instructions given. See AVS for details.      Return in about 1 week (around 12/15/2022), or if symptoms worsen or fail to improve. for further evaluation.     Nataliya Virk MD  [facility] KENN Whyte is a 32 year old, presenting for the following health issues:  Back Pain      History of Present Illness       Back Pain:  She presents for follow up of back pain. Patient's back pain is a recurring problem.  Location of back pain:  Right lower back, left lower back, right buttock, left buttock, left hip and left side of waist  Description of back pain: sharp, shooting and stabbing  Back pain spreads: left buttocks, left thigh and left knee    Since patient first noticed back pain, pain is: gradually worsening  Does back pain interfere with her job:  Yes      She eats 2-3 servings of fruits and vegetables daily.She consumes 0 sweetened beverage(s) daily.She exercises with enough effort to increase her heart rate 20 to 29 minutes per day.  She exercises with enough effort to increase her heart rate 3 or less days per week.   She is taking medications regularly.    Today's PHQ-9         PHQ-9 Total Score: 5    PHQ-9 Q9 Thoughts of better off dead/self-harm past 2 weeks :   Not at all    How difficult have these problems made it for you to do your work, take care of things at home, or get along with other  people: Somewhat difficult       Patient reports having recurring low back pain radiating to the lower extremity. States that she's had episodes in the past prompting office visits in 12/2021 and 4/2022 treated with Steroid pack and muscle relaxant that was effective - see Epic for details.    No recent diabetes screen on file.     Lumbar Spine X ray done in 4/22 revealed Mild interspace narrowing at  L5-S1. Other interspaces appear preserved. Normal facets.    Patient reports that the pain has worsened with each episode.   Denies any recent history of injury or trauma.   States that this recent episode started about  5 days ago and describes it as sharp, radiating on the left side with a pain score of 7/10. No lower extremity. No numbness or weakness.         Review of Systems   Constitutional, HEENT, cardiovascular, pulmonary, gi and gu systems are negative, except as otherwise noted.      Objective           Vitals:  No vitals were obtained today due to virtual visit.    Physical Exam   GENERAL: Healthy, alert and no distress  NEURO: Cranial nerves grossly intact.  Mentation and speech appropriate for age.  PSYCH: Mentation appears normal, affect normal/bright, judgement and insight intact, normal speech and appearance well-groomed.    DATA  Previous Lumbar Spine X ray reviewed.             Video-Visit Details    Video Start Time: 12:20 pm    Type of service:  Video Visit    Video End Time:12:40 pm     Originating Location (pt. Location): Home    Distant Location (provider location):  On-site    Platform used for Video Visit: Rohit

## 2023-01-31 ASSESSMENT — ENCOUNTER SYMPTOMS
MYALGIAS: 1
HEMATOCHEZIA: 0
PALPITATIONS: 0
COUGH: 0
CONSTIPATION: 0
HEMATURIA: 0
NERVOUS/ANXIOUS: 0
DIZZINESS: 0
SHORTNESS OF BREATH: 0
DIARRHEA: 0
CHILLS: 0
ABDOMINAL PAIN: 0
FEVER: 0
HEARTBURN: 1
BREAST MASS: 0
JOINT SWELLING: 0
FREQUENCY: 0
ARTHRALGIAS: 0
EYE PAIN: 0
PARESTHESIAS: 0
HEADACHES: 0
SORE THROAT: 0
WEAKNESS: 0
DYSURIA: 0
NAUSEA: 0

## 2023-02-01 ENCOUNTER — OFFICE VISIT (OUTPATIENT)
Dept: FAMILY MEDICINE | Facility: CLINIC | Age: 33
End: 2023-02-01
Payer: COMMERCIAL

## 2023-02-01 VITALS
RESPIRATION RATE: 12 BRPM | OXYGEN SATURATION: 98 % | WEIGHT: 263 LBS | DIASTOLIC BLOOD PRESSURE: 88 MMHG | HEART RATE: 90 BPM | SYSTOLIC BLOOD PRESSURE: 124 MMHG | HEIGHT: 66 IN | BODY MASS INDEX: 42.27 KG/M2 | TEMPERATURE: 98.1 F

## 2023-02-01 DIAGNOSIS — E66.01 MORBID OBESITY WITH BMI OF 40.0-44.9, ADULT (H): ICD-10-CM

## 2023-02-01 DIAGNOSIS — Z98.84 BARIATRIC SURGERY STATUS: ICD-10-CM

## 2023-02-01 DIAGNOSIS — Z11.4 SCREENING FOR HIV (HUMAN IMMUNODEFICIENCY VIRUS): ICD-10-CM

## 2023-02-01 DIAGNOSIS — Z76.89 ENCOUNTER TO ESTABLISH CARE WITH NEW DOCTOR: ICD-10-CM

## 2023-02-01 DIAGNOSIS — M54.16 LUMBAR RADICULOPATHY: ICD-10-CM

## 2023-02-01 DIAGNOSIS — Z12.4 CERVICAL CANCER SCREENING: ICD-10-CM

## 2023-02-01 DIAGNOSIS — K21.00 GASTROESOPHAGEAL REFLUX DISEASE WITH ESOPHAGITIS WITHOUT HEMORRHAGE: ICD-10-CM

## 2023-02-01 DIAGNOSIS — I27.20 PULMONARY HYPERTENSION (H): ICD-10-CM

## 2023-02-01 DIAGNOSIS — Z11.59 NEED FOR HEPATITIS C SCREENING TEST: ICD-10-CM

## 2023-02-01 DIAGNOSIS — Z23 ENCOUNTER FOR IMMUNIZATION: ICD-10-CM

## 2023-02-01 DIAGNOSIS — Z00.00 ROUTINE GENERAL MEDICAL EXAMINATION AT A HEALTH CARE FACILITY: Primary | ICD-10-CM

## 2023-02-01 LAB
ALBUMIN SERPL BCG-MCNC: 4.5 G/DL (ref 3.5–5.2)
ALP SERPL-CCNC: 84 U/L (ref 35–104)
ALT SERPL W P-5'-P-CCNC: 31 U/L (ref 10–35)
ANION GAP SERPL CALCULATED.3IONS-SCNC: 14 MMOL/L (ref 7–15)
AST SERPL W P-5'-P-CCNC: 28 U/L (ref 10–35)
BILIRUB SERPL-MCNC: 0.3 MG/DL
BUN SERPL-MCNC: 5.7 MG/DL (ref 6–20)
CALCIUM SERPL-MCNC: 9.9 MG/DL (ref 8.6–10)
CHLORIDE SERPL-SCNC: 103 MMOL/L (ref 98–107)
CHOLEST SERPL-MCNC: 234 MG/DL
CREAT SERPL-MCNC: 0.74 MG/DL (ref 0.51–0.95)
DEPRECATED HCO3 PLAS-SCNC: 21 MMOL/L (ref 22–29)
FERRITIN SERPL-MCNC: 275 NG/ML (ref 6–175)
GFR SERPL CREATININE-BSD FRML MDRD: >90 ML/MIN/1.73M2
GLUCOSE SERPL-MCNC: 97 MG/DL (ref 70–99)
HDLC SERPL-MCNC: 72 MG/DL
IRON BINDING CAPACITY (ROCHE): 351 UG/DL (ref 240–430)
IRON SATN MFR SERPL: 22 % (ref 15–46)
IRON SERPL-MCNC: 76 UG/DL (ref 37–145)
LDLC SERPL CALC-MCNC: 139 MG/DL
NONHDLC SERPL-MCNC: 162 MG/DL
POTASSIUM SERPL-SCNC: 4.2 MMOL/L (ref 3.4–5.3)
PROT SERPL-MCNC: 8 G/DL (ref 6.4–8.3)
SODIUM SERPL-SCNC: 138 MMOL/L (ref 136–145)
TRIGL SERPL-MCNC: 115 MG/DL
TSH SERPL DL<=0.005 MIU/L-ACNC: 1.71 UIU/ML (ref 0.3–4.2)
VIT B12 SERPL-MCNC: 762 PG/ML (ref 232–1245)

## 2023-02-01 PROCEDURE — 80061 LIPID PANEL: CPT | Performed by: NURSE PRACTITIONER

## 2023-02-01 PROCEDURE — 82607 VITAMIN B-12: CPT | Performed by: NURSE PRACTITIONER

## 2023-02-01 PROCEDURE — 86803 HEPATITIS C AB TEST: CPT | Performed by: NURSE PRACTITIONER

## 2023-02-01 PROCEDURE — 82728 ASSAY OF FERRITIN: CPT | Performed by: NURSE PRACTITIONER

## 2023-02-01 PROCEDURE — 84443 ASSAY THYROID STIM HORMONE: CPT | Performed by: NURSE PRACTITIONER

## 2023-02-01 PROCEDURE — 83550 IRON BINDING TEST: CPT | Performed by: NURSE PRACTITIONER

## 2023-02-01 PROCEDURE — 86364 TISS TRNSGLTMNASE EA IG CLAS: CPT | Performed by: NURSE PRACTITIONER

## 2023-02-01 PROCEDURE — 99214 OFFICE O/P EST MOD 30 MIN: CPT | Mod: 25 | Performed by: NURSE PRACTITIONER

## 2023-02-01 PROCEDURE — 90686 IIV4 VACC NO PRSV 0.5 ML IM: CPT | Performed by: NURSE PRACTITIONER

## 2023-02-01 PROCEDURE — 87389 HIV-1 AG W/HIV-1&-2 AB AG IA: CPT | Performed by: NURSE PRACTITIONER

## 2023-02-01 PROCEDURE — 80053 COMPREHEN METABOLIC PANEL: CPT | Performed by: NURSE PRACTITIONER

## 2023-02-01 PROCEDURE — 90471 IMMUNIZATION ADMIN: CPT | Performed by: NURSE PRACTITIONER

## 2023-02-01 PROCEDURE — 83540 ASSAY OF IRON: CPT | Performed by: NURSE PRACTITIONER

## 2023-02-01 PROCEDURE — 99395 PREV VISIT EST AGE 18-39: CPT | Mod: 25 | Performed by: NURSE PRACTITIONER

## 2023-02-01 PROCEDURE — 82306 VITAMIN D 25 HYDROXY: CPT | Performed by: NURSE PRACTITIONER

## 2023-02-01 PROCEDURE — 36415 COLL VENOUS BLD VENIPUNCTURE: CPT | Performed by: NURSE PRACTITIONER

## 2023-02-01 RX ORDER — MELOXICAM 15 MG/1
7.5-15 TABLET ORAL DAILY
Qty: 30 TABLET | Refills: 0 | Status: SHIPPED | OUTPATIENT
Start: 2023-02-01 | End: 2024-02-13

## 2023-02-01 RX ORDER — GABAPENTIN 100 MG/1
100-300 CAPSULE ORAL 3 TIMES DAILY
Qty: 270 CAPSULE | Refills: 0 | Status: SHIPPED | OUTPATIENT
Start: 2023-02-01 | End: 2024-02-13

## 2023-02-01 RX ORDER — TRAZODONE HYDROCHLORIDE 50 MG/1
TABLET, FILM COATED ORAL
COMMUNITY
Start: 2023-01-22

## 2023-02-01 RX ORDER — BUPROPION HYDROCHLORIDE 300 MG/1
1 TABLET ORAL
COMMUNITY
Start: 2023-01-29 | End: 2024-02-13

## 2023-02-01 ASSESSMENT — ENCOUNTER SYMPTOMS
CHILLS: 0
DIZZINESS: 0
WEAKNESS: 0
NAUSEA: 0
JOINT SWELLING: 0
PARESTHESIAS: 0
ABDOMINAL PAIN: 0
NERVOUS/ANXIOUS: 0
ARTHRALGIAS: 0
DIARRHEA: 0
BREAST MASS: 0
HEMATOCHEZIA: 0
HEARTBURN: 1
EYE PAIN: 0
MYALGIAS: 1
SHORTNESS OF BREATH: 0
COUGH: 0
FEVER: 0
CONSTIPATION: 0
SORE THROAT: 0
PALPITATIONS: 0
HEADACHES: 0
HEMATURIA: 0
FREQUENCY: 0
DYSURIA: 0

## 2023-02-01 ASSESSMENT — PAIN SCALES - GENERAL: PAINLEVEL: MODERATE PAIN (4)

## 2023-02-01 NOTE — PROGRESS NOTES
SUBJECTIVE:   CC: Isabell is an 32 year old who presents for preventive health visit.       Patient has been advised of split billing requirements and indicates understanding: Yes   Establish Care: Chronic Medical  - Gastric Sleeve: 2019; did well with weight loss with the first year and now regained weight; 2018 ->225 lb; slowing regaining over 3 yrs now 263vlbs    - chronic back pain: with radiculopathy LLE; seen in ED and UC several times for treatment; tried TENS unit; ice and heat; flares every 3 months,   IMPRESSION: Five lumbar vertebral bodies. Mild convex right upper  lumbar curvature. An IUD projects over the pelvis. Normal lumbar  vertebral body heights. No fracture. Mild interspace narrowing at  L5-S1. Other interspaces appear preserved. Normal facets.    - Pulmonary HTN: found after  had infection;   Echo 2016  Final Impressions:    1. Normal left ventricular size, normal wall thickness, normal global systolic function, calculated EF of 59 %.    2. The mitral valve is normal, trace mitral regurgitation.    3. The inferior vena cava is dilated, respiratory size variation greater than 50%.    4. Mildly increased estimated pulmonary pressures by tricuspid regurgitation velocity and right atrial pressure (39 mmHg plus RAP).     - IUD: light bleeding    - Depression/Anxiety:  [facility] telethealth; following every 4 months    - GERD:s/p H. Hernia repair; taking daily 40 mg for 1 year; unable to reduce down d/t symptoms     Healthy Habits:     Getting at least 3 servings of Calcium per day:  Yes    Bi-annual eye exam:  NO    Dental care twice a year:  Yes    Sleep apnea or symptoms of sleep apnea:  None    Diet:  Regular (no restrictions)    Frequency of exercise:  1 day/week    Duration of exercise:  15-30 minutes    Taking medications regularly:  Yes    Medication side effects:  None    PHQ-2 Total Score: 0    Additional concerns today:  Yes      Today's PHQ-2 Score:   PHQ-2 (  [employer])  1/31/2023   Q1: Little interest or pleasure in doing things 0   Q2: Feeling down, depressed or hopeless 0   PHQ-2 Score 0   Q1: Little interest or pleasure in doing things Not at all   Q2: Feeling down, depressed or hopeless Not at all   PHQ-2 Score 0       Have you ever done Advance Care Planning? (For example, a Health Directive, POLST, or a discussion with a medical provider or your loved ones about your wishes): No, advance care planning information given to patient to review.  Patient declined advance care planning discussion at this time.    Social History     Tobacco Use     Smoking status: Never     Smokeless tobacco: Never     Tobacco comments:     Not applicable   Substance Use Topics     Alcohol use: Not Currently     If you drink alcohol do you typically have >3 drinks per day or >7 drinks per week? No    Alcohol Use 2/1/2023   Prescreen: >3 drinks/day or >7 drinks/week? -   Prescreen: >3 drinks/day or >7 drinks/week? No       Reviewed orders with patient.  Reviewed health maintenance and updated orders accordingly - Yes  Lab work is in process  Labs reviewed in EPIC  BP Readings from Last 3 Encounters:   02/01/23 124/88   11/15/22 107/73   04/26/22 110/80    Wt Readings from Last 3 Encounters:   02/01/23 119.3 kg (263 lb)   04/26/22 115.8 kg (255 lb 4 oz)   04/14/22 114.5 kg (252 lb 8 oz)               Breast Cancer Screening:    Breast CA Risk Assessment (FHS-7) 1/31/2023   Do you have a family history of breast, colon, or ovarian cancer? No / Unknown         Patient under 40 years of age: Routine Mammogram Screening not recommended.   Pertinent mammograms are reviewed under the imaging tab.    History of abnormal Pap smear: NO - age 30-65 PAP every 5 years with negative HPV co-testing recommended   (NOTE)   Gyn Cytology Report   Patient Name: PETER GONZALEZ   Accession #:[id number]   Taken: 1/27/2014   Received: 1/28/2014   Reported: 1/30/2014   Physician(s): KRYSTAL FRANCE ([id number])                     "      Source of Specimen   Pap Test, Routine Cervical/Endocervical:       Specimen Adequacy       Satisfactory for evaluation.  Endocervical component absent.          Final Cytologic Interpretation/Result   NEGATIVE FOR INTRAEPITHELIAL LESION OR MALIGNANCY (NILM)             Reviewed and updated as needed this visit by clinical staff   Tobacco  Allergies  Meds  Problems  Med Hx  Surg Hx  Fam Hx          Reviewed and updated as needed this visit by Provider   Tobacco  Allergies  Meds  Problems  Med Hx  Surg Hx  Fam Hx           Past Medical History:   Diagnosis Date     Anxiety      Depression      Depressive disorder 09/01/2020     Diabetes (H) Gestational 2016        Review of Systems   Constitutional: Negative for chills and fever.   HENT: Negative for congestion, ear pain, hearing loss and sore throat.    Eyes: Negative for pain and visual disturbance.   Respiratory: Negative for cough and shortness of breath.    Cardiovascular: Negative for chest pain, palpitations and peripheral edema.   Gastrointestinal: Positive for heartburn. Negative for abdominal pain, constipation, diarrhea, hematochezia and nausea.   Breasts:  Positive for tenderness. Negative for breast mass and discharge.   Genitourinary: Negative for dysuria, frequency, genital sores, hematuria, pelvic pain, urgency, vaginal bleeding and vaginal discharge.   Musculoskeletal: Positive for myalgias. Negative for arthralgias and joint swelling.   Skin: Negative for rash.   Neurological: Negative for dizziness, weakness, headaches and paresthesias.   Psychiatric/Behavioral: Negative for mood changes. The patient is not nervous/anxious.          OBJECTIVE:   /88   Pulse 90   Temp 98.1  F (36.7  C) (Temporal)   Resp 12   Ht 1.664 m (5' 5.5\")   Wt 119.3 kg (263 lb)   LMP 01/07/2023   SpO2 98%   Breastfeeding No   BMI 43.10 kg/m    Physical Exam  GENERAL: healthy, alert and no distress  EYES: Eyes grossly normal to inspection, " PERRL and conjunctivae and sclerae normal  HENT: ear canals and TM's normal, nose and mouth without ulcers or lesions  NECK: no adenopathy, no asymmetry, masses, or scars and thyroid normal to palpation  RESP: lungs clear to auscultation - no rales, rhonchi or wheezes  BREAST: normal without masses, tenderness or nipple discharge and no palpable axillary masses or adenopathy  CV: regular rate and rhythm, normal S1 S2, no S3 or S4, no murmur, click or rub, no peripheral edema and peripheral pulses strong  ABDOMEN: soft, nontender, no hepatosplenomegaly, no masses and bowel sounds normal  MS: no gross musculoskeletal defects noted, no edema  SKIN: no suspicious lesions or rashes  NEURO: Normal strength and tone, mentation intact and speech normal  PSYCH: mentation appears normal, affect normal/bright    Diagnostic Test Results:  Labs reviewed in Epic    ASSESSMENT/PLAN:   Isabell was seen today for physical and establish care.    Diagnoses and all orders for this visit:    Routine general medical examination at a health care facility  Preventative exam w/no abnormalities and/or concerns listed in diagnoses; discussed health maintenance screenings including prostate, breast, cervical and colorectal ca screenings related to gender;  reviewed and reconciled medication, medical history and patient related health concerns  Plan: obtain metabolic labs      -     REVIEW OF HEALTH MAINTENANCE PROTOCOL ORDERS  -     Lipid Profile; Future  -     Comprehensive metabolic panel; Future  -     TSH with free T4 reflex; Future  -     Vitamin D Deficiency; Future  -     Hepatitis C Screen Reflex to HCV RNA Quant and Genotype    Screening for HIV (human immunodeficiency virus)  -     HIV Antigen Antibody Combo; Future    Need for hepatitis C screening test  -     Hepatitis C Screen Reflex to HCV RNA Quant and Genotype; Future    Cervical cancer screening  Last pap 2014;   - deferred; will complete at next visit      Pulmonary  hypertension (H)  Stable; last echo 2016; evaluate echo for changes  -     Echocardiogram Complete; Future  -     Lipid Profile; Future  -     Comprehensive metabolic panel; Future    Morbid obesity with BMI of 40.0-44.9, adult (H)  Bariatric surgery status  S/p gastric sleeve; check nutritional and vitamin level  -     Lipid Profile; Future  -     Comprehensive metabolic panel; Future  -     TSH with free T4 reflex; Future  -     Vitamin D Deficiency; Future  -     Vitamin B12; Future  -     Ferritin; Future  -     Iron & Iron Binding Capacity; Future  -     PRIMARY CARE FOLLOW-UP SCHEDULING; Future  -     Lipid Profile  -     Comprehensive metabolic panel  -     TSH with free T4 reflex  -     Vitamin D Deficiency  -     Vitamin B12  -     Ferritin  -     Iron & Iron Binding Capacity    Lumbar radiculopathy  No red flags on exam; follow up in 6-8 weeks if no improvement; additional imaging and orthopedic referral   -     Physical Therapy Referral; Future  -     meloxicam (MOBIC) 15 MG tablet; Take 0.5-1 tablets (7.5-15 mg) by mouth daily  -     gabapentin (NEURONTIN) 100 MG capsule; Take 1-3 capsules (100-300 mg) by mouth 3 times daily    Encounter to establish care with new doctor  Reviewed chronic health conditions; medications, labs and pertinent health concerns today    Gastroesophageal reflux disease with esophagitis without hemorrhage  S/p hiatal hernia; continues w/uncontrolled GERD; will r/o celiac and h pylori   -     Helicobacter pylori Antigen Stool; Future  -     Tissue transglutaminase lorene IgA and IgG; Future  -     Helicobacter pylori Antigen Stool  -     Tissue transglutaminase lorene IgA and IgG      Encounter for immunization  -     INFLUENZA VACCINE >6 MONTHS (AFLURIA/FLUZONE)        Patient has been advised of split billing requirements and indicates understanding: Yes      COUNSELING:  Reviewed preventive health counseling, as reflected in patient instructions       Colorectal Cancer Screening       " HIV screeninx in teen years, 1x in adult years, and at intervals if high risk      BMI:   Estimated body mass index is 43.1 kg/m  as calculated from the following:    Height as of this encounter: 1.664 m (5' 5.5\").    Weight as of this encounter: 119.3 kg (263 lb).   Weight management plan: Discussed healthy diet and exercise guidelines      She reports that she has never smoked. She has never used smokeless tobacco.      KOJO Wset CNP  [facility]  "

## 2023-02-02 LAB
DEPRECATED CALCIDIOL+CALCIFEROL SERPL-MC: 23 UG/L (ref 20–75)
HCV AB SERPL QL IA: NONREACTIVE
HIV 1+2 AB+HIV1 P24 AG SERPL QL IA: NONREACTIVE

## 2023-02-02 PROCEDURE — 87338 HPYLORI STOOL AG IA: CPT | Performed by: NURSE PRACTITIONER

## 2023-02-06 LAB — H PYLORI AG STL QL IA: NEGATIVE

## 2023-02-08 LAB
TTG IGA SER-ACNC: 0.4 U/ML
TTG IGG SER-ACNC: 0.7 U/ML

## 2023-02-15 NOTE — RESULT ENCOUNTER NOTE
Isabell    -Your cholesterol is borderline high, but your overall heart disease risk score is within normal limits less than 7.5%    Continue exercise and heart healthy Mediterranean-style diet rich in fish, olive oil, whole grains, vegetables and low in animal fats and processed foods to reduce your risk of heart disease.    Follow up in 1 year for your annual physical and cholesterol monitoring.    Sincerely  KOJO West CNP

## 2023-03-29 ENCOUNTER — VIRTUAL VISIT (OUTPATIENT)
Dept: FAMILY MEDICINE | Facility: CLINIC | Age: 33
End: 2023-03-29
Attending: NURSE PRACTITIONER
Payer: COMMERCIAL

## 2023-03-29 DIAGNOSIS — K21.00 GASTROESOPHAGEAL REFLUX DISEASE WITH ESOPHAGITIS WITHOUT HEMORRHAGE: Primary | ICD-10-CM

## 2023-03-29 DIAGNOSIS — E66.01 CLASS 3 SEVERE OBESITY DUE TO EXCESS CALORIES WITH SERIOUS COMORBIDITY AND BODY MASS INDEX (BMI) OF 40.0 TO 44.9 IN ADULT (H): ICD-10-CM

## 2023-03-29 DIAGNOSIS — E66.813 CLASS 3 SEVERE OBESITY DUE TO EXCESS CALORIES WITH SERIOUS COMORBIDITY AND BODY MASS INDEX (BMI) OF 40.0 TO 44.9 IN ADULT (H): ICD-10-CM

## 2023-03-29 PROCEDURE — 99214 OFFICE O/P EST MOD 30 MIN: CPT | Mod: VID | Performed by: NURSE PRACTITIONER

## 2023-03-29 NOTE — PROGRESS NOTES
"    Isabell is a 33 year old who is being evaluated via a billable video visit.      How would you like to obtain your AVS? MyChart  If the video visit is dropped, the invitation should be resent by: Text to cell phone: [phone]  Will anyone else be joining your video visit? No      Assessment & Plan     Gastroesophageal reflux disease with esophagitis without hemorrhage  Change 40 mg daily to 20 mg BOD; follow up in 2-3 weeks  - omeprazole (PRILOSEC) 20 MG DR capsule  Dispense: 180 capsule; Refill: 1    Class 3 severe obesity due to excess calories with serious comorbidity and body mass index (BMI) of 40.0 to 44.9 in adult (H)  discussed treatment options w/phentermine, topiramate, natrexone, wellbutrin and glp-1 therapy; reviewed side effects of all medications; discussed and lifestyle changes including starting an exercise/activity program 30 minutes daily, daily caloric/cho/protein; meal tracking;    - reduce caloric intake 1800-2K per day; cho 100-150 gms per meal; protein 25-30 gm per meal  - Semaglutide-Weight Management (WEGOVY) 0.25 MG/0.5ML pen  Dispense: 2 mL; Refill: 0  - Semaglutide-Weight Management (WEGOVY) 0.5 MG/0.5ML pen  Dispense: 2 mL; Refill: 0  - Semaglutide-Weight Management (WEGOVY) 1 MG/0.5ML pen  Dispense: 2 mL; Refill:   - PRIMARY CARE FOLLOW-UP SCHEDULING        KOJO [facility]    Subjective   Isabell is a 33 year old, presenting for the following health issues:  Weight Loss (Also follow up heart burn)    Additional Questions 4/26/2022   Roomed by Cp   Accompanied by -     - GERD: prilosec 40 mg daily occasionally taking additional 40 mg in the evening d/t increase reflux; h/o H. Hernia s/p laparoscopic sleeve gastrectomy and hiatal hernia repair. \" Then the hiatal hernia was repaired with 3 posterior stitches of 0 Ethibond and 1 anterior stitch over a 40 Fr. Blunt tip Bougie was in the esophagus. This closed the hiatus adequately " "without stenosing it\"       - Weight management s/p laparoscopic sleeve gastrectomy 2019 succesful weight loss did well with weight loss with the first year and now regained weight; 2018 ->225 lb; slowing regaining over 3 yrs now 263vlbs  Activity: 45-60 minutes gym   Goal weight: 200 lbs    - back pain: taking gabapentin and meloxicam prn effective.      History of Present Illness       Reason for visit:  Weight management, GERD/Heartburn    She eats 2-3 servings of fruits and vegetables daily.She consumes 0 sweetened beverage(s) daily.She exercises with enough effort to increase her heart rate 20 to 29 minutes per day.  She exercises with enough effort to increase her heart rate 3 or less days per week.   She is taking medications regularly.               Review of Systems         Objective    Vitals - Patient Reported  Weight (Patient Reported): 117.9 kg (260 lb)  Height (Patient Reported): 167.6 cm (5' 6\")  BMI (Based on Pt Reported Ht/Wt): 41.96  Pain Score: No Pain (0)        Physical Exam   GENERAL: Healthy, alert and no distress  EYES: Eyes grossly normal to inspection.  No discharge or erythema, or obvious scleral/conjunctival abnormalities.  RESP: No audible wheeze, cough, or visible cyanosis.  No visible retractions or increased work of breathing.    SKIN: Visible skin clear. No significant rash, abnormal pigmentation or lesions.  NEURO: Cranial nerves grossly intact.  Mentation and speech appropriate for age.  PSYCH: Mentation appears normal, affect normal/bright, judgement and insight intact, normal speech and appearance well-groomed.                Video-Visit Details    Type of service:  Video Visit     Originating Location (pt. Location): Home    Distant Location (provider location):  On-site  Platform used for Video Visit: Rohit"

## 2023-03-30 ENCOUNTER — TELEPHONE (OUTPATIENT)
Dept: FAMILY MEDICINE | Facility: CLINIC | Age: 33
End: 2023-03-30
Payer: COMMERCIAL

## 2023-03-30 DIAGNOSIS — E66.01 CLASS 3 SEVERE OBESITY DUE TO EXCESS CALORIES WITH SERIOUS COMORBIDITY AND BODY MASS INDEX (BMI) OF 40.0 TO 44.9 IN ADULT (H): ICD-10-CM

## 2023-03-30 DIAGNOSIS — E66.813 CLASS 3 SEVERE OBESITY DUE TO EXCESS CALORIES WITH SERIOUS COMORBIDITY AND BODY MASS INDEX (BMI) OF 40.0 TO 44.9 IN ADULT (H): ICD-10-CM

## 2023-03-31 ENCOUNTER — MYC MEDICAL ADVICE (OUTPATIENT)
Dept: FAMILY MEDICINE | Facility: CLINIC | Age: 33
End: 2023-03-31

## 2023-03-31 ENCOUNTER — TELEPHONE (OUTPATIENT)
Dept: FAMILY MEDICINE | Facility: CLINIC | Age: 33
End: 2023-03-31

## 2023-03-31 NOTE — TELEPHONE ENCOUNTER
PA started for Semaglutide-Weight Management (WEGOVY) 0.5 MG/0.5ML pen.  Log into [email or web address] and enter info from below:    Key: [id number]  Patient last name: AKI MELTON: [birth year]

## 2023-04-03 NOTE — TELEPHONE ENCOUNTER
Central Prior Authorization Team   Phone: [phone]    PA Initiation    Medication: Semaglutide-Weight Management (WEGOVY) 0.25 MG/0.5ML pen, rec 3-29-23  Insurance Company:    Pharmacy Filling the Rx: [id number] - [place], [place] - [address]  AT [address]  Filling Pharmacy Phone: [phone]  Filling Pharmacy Fax: [phone]  Start Date: 3/31/2023      
PA started for Semaglutide-Weight Management (WEGOVY) 0.25 MG/0.5ML pen.  Log into [email or web address] and enter info from below:    Key: [id number]  Patient last name: Thor Cehek DOB: [birth year]  
Prior Authorization Approval    Authorization Effective Date: 3/1/2023  Authorization Expiration Date: 3/30/2024  Medication: Semaglutide-Weight Management (WEGOVY) 0.25 MG/0.5ML pen - APPROVED  Approved Dose/Quantity:    Reference #:     Insurance Company:    Expected CoPay:       CoPay Card Available:      Foundation Assistance Needed:    Which Pharmacy is filling the prescription (Not needed for infusion/clinic administered): [id number] - [place], [place] - [address]  AT [address]  Pharmacy Notified: Yes  Patient Notified: Yes  **Instructed pharmacy to notify patient when script is ready to /ship.**        
Simple: Patient demonstrates quick and easy understanding/Verbalized Understanding

## 2023-04-04 NOTE — TELEPHONE ENCOUNTER
No pa needed- there is already a pa approval for this medication. Please see encounter from 3/30/23.

## 2023-04-25 NOTE — PROGRESS NOTES
"CC:  Pap smear  HPI:  Isabell Maza is a 33 year old female  No LMP recorded. (Menstrual status: IUD).  Mirena IUD in place since .  She was unaware good for 8 years and was thinking removal today but would like to keep a little bit longer and remove when ready for another child.    Last pregnancy had GDM diet controlled.  Was an induction of labor for postdates and pushed for a while with triple I infection.  Had  with postpartum hemorrhage, infant was coded and ended up in special cares for a month.  She has some PTSD from that delivery and will be seeing a therapist prior to next pregnancy.  Would like a repeat  and avoid events from last time.    Allergies: Patient has no known allergies.    EXAM:  Blood pressure 115/76, pulse 93, height 1.676 m (5' 6\"), weight 117.5 kg (259 lb), SpO2 97 %, not currently breastfeeding.   BMI= Body mass index is 41.8 kg/m .  General - pleasant female in no acute distress.  Pelvic - EG: adult female, BUS: within normal limits, Vagina: well rugated, no discharge, Cervix: no lesions or CMT, NO IUD strings seen Uterus: firm, normal sized and nontender, Adnexae: no masses or tenderness.  Rectovaginal - deferred.  Musculoskeletal - no gross deformities.  Neurological - normal strength, sensation, and mental status.    ASSESSMENT/PLAN:  (Z12.4) Pap smear for cervical cancer screening  (primary encounter diagnosis)  Comment: mirena   Plan: Pap screen with HPV - recommended age 30 - 65         years        Discussed sending pap smear for HPV typing as well and that if both pap and HPV are negative, then can have q5 year pap smears.    Discussed return to clinic for IUD removal when she is ready for pregnancy.  Discussed therapist and possible EMDR.  Discussed early GCT, genetic testing given AMA and repeat  section.  Reviewed that we deliver out of [place].  Questions answered.    CAMILLE VILLEDA MD  "

## 2023-04-27 ENCOUNTER — OFFICE VISIT (OUTPATIENT)
Dept: OBGYN | Facility: CLINIC | Age: 33
End: 2023-04-27
Payer: COMMERCIAL

## 2023-04-27 VITALS
OXYGEN SATURATION: 97 % | BODY MASS INDEX: 41.62 KG/M2 | WEIGHT: 259 LBS | SYSTOLIC BLOOD PRESSURE: 115 MMHG | HEART RATE: 93 BPM | DIASTOLIC BLOOD PRESSURE: 76 MMHG | HEIGHT: 66 IN

## 2023-04-27 DIAGNOSIS — Z12.4 PAP SMEAR FOR CERVICAL CANCER SCREENING: Primary | ICD-10-CM

## 2023-04-27 PROCEDURE — 99212 OFFICE O/P EST SF 10 MIN: CPT | Performed by: OBSTETRICS & GYNECOLOGY

## 2023-04-27 PROCEDURE — G0145 SCR C/V CYTO,THINLAYER,RESCR: HCPCS | Performed by: OBSTETRICS & GYNECOLOGY

## 2023-04-27 PROCEDURE — 87624 HPV HI-RISK TYP POOLED RSLT: CPT | Performed by: OBSTETRICS & GYNECOLOGY

## 2023-05-01 LAB
BKR LAB AP GYN ADEQUACY: NORMAL
BKR LAB AP GYN INTERPRETATION: NORMAL
BKR LAB AP HPV REFLEX: NORMAL
BKR LAB AP PREVIOUS ABNORMAL: NORMAL
PATH REPORT.COMMENTS IMP SPEC: NORMAL
PATH REPORT.COMMENTS IMP SPEC: NORMAL
PATH REPORT.RELEVANT HX SPEC: NORMAL

## 2023-05-03 LAB
HUMAN PAPILLOMA VIRUS 16 DNA: NEGATIVE
HUMAN PAPILLOMA VIRUS 18 DNA: NEGATIVE
HUMAN PAPILLOMA VIRUS FINAL DIAGNOSIS: ABNORMAL
HUMAN PAPILLOMA VIRUS OTHER HR: POSITIVE

## 2023-05-04 ENCOUNTER — PATIENT OUTREACH (OUTPATIENT)
Dept: OBGYN | Facility: CLINIC | Age: 33
End: 2023-05-04
Payer: COMMERCIAL

## 2023-06-05 ENCOUNTER — MYC MEDICAL ADVICE (OUTPATIENT)
Dept: FAMILY MEDICINE | Facility: CLINIC | Age: 33
End: 2023-06-05
Payer: COMMERCIAL

## 2023-06-05 DIAGNOSIS — E66.813 CLASS 3 SEVERE OBESITY DUE TO EXCESS CALORIES WITH SERIOUS COMORBIDITY AND BODY MASS INDEX (BMI) OF 40.0 TO 44.9 IN ADULT (H): ICD-10-CM

## 2023-06-05 DIAGNOSIS — E66.01 CLASS 3 SEVERE OBESITY DUE TO EXCESS CALORIES WITH SERIOUS COMORBIDITY AND BODY MASS INDEX (BMI) OF 40.0 TO 44.9 IN ADULT (H): ICD-10-CM

## 2023-06-08 NOTE — TELEPHONE ENCOUNTER
Please notify patient she can continue 0.5 mg weekly until 1 mg available; or increase to 1.7 mg if available may have more GI side effect for a few weeks.   SW

## 2023-06-08 NOTE — TELEPHONE ENCOUNTER
Writer responded via [employer].    SHAKA AbelN, RN-BC  [facility]

## 2023-06-08 NOTE — TELEPHONE ENCOUNTER
Yasemin: pended refill of 0.5 mg dose to double up until 1 mg is available, if you wish to change sig.    I took .25mg of the wegovy injection for four weeks followed by four weeks of the .5mg dose. On 5/29/23, I was supposed to take the first injection of the 1mg dose. However, my prescription could not be filled due to being on back order. I contacted several different pharmacies and was informed their was a shortage with some doses. My pharmacy reported that they should have more in stock on 6/9/23.      How should I proceed in taking the injections now that I haven t had any wegovy since my last .5mg dose on 5/22/23?      Joann MORENO RN  [facility]

## 2023-06-13 NOTE — TELEPHONE ENCOUNTER
"Shontel-Please review and sign if agree.    \"Hi,     All the low doses of Wegovy are still out and my pharmacy, along with others, have said it may be awhile due to the  s supply. I would like to increase to 1.7mg then, but would need an updated prescription. Thanks!\"    Thank you!  SHAKA AbelN, RN-[facility]    "

## 2023-06-16 DIAGNOSIS — E66.813 CLASS 3 SEVERE OBESITY DUE TO EXCESS CALORIES WITH SERIOUS COMORBIDITY AND BODY MASS INDEX (BMI) OF 40.0 TO 44.9 IN ADULT (H): Primary | ICD-10-CM

## 2023-06-16 DIAGNOSIS — E66.01 CLASS 3 SEVERE OBESITY DUE TO EXCESS CALORIES WITH SERIOUS COMORBIDITY AND BODY MASS INDEX (BMI) OF 40.0 TO 44.9 IN ADULT (H): Primary | ICD-10-CM

## 2023-06-30 ENCOUNTER — VIRTUAL VISIT (OUTPATIENT)
Dept: FAMILY MEDICINE | Facility: CLINIC | Age: 33
End: 2023-06-30
Attending: NURSE PRACTITIONER
Payer: COMMERCIAL

## 2023-06-30 DIAGNOSIS — K59.03 DRUG-INDUCED CONSTIPATION: ICD-10-CM

## 2023-06-30 DIAGNOSIS — E66.01 CLASS 3 SEVERE OBESITY DUE TO EXCESS CALORIES WITH SERIOUS COMORBIDITY AND BODY MASS INDEX (BMI) OF 40.0 TO 44.9 IN ADULT (H): Primary | ICD-10-CM

## 2023-06-30 DIAGNOSIS — E66.813 CLASS 3 SEVERE OBESITY DUE TO EXCESS CALORIES WITH SERIOUS COMORBIDITY AND BODY MASS INDEX (BMI) OF 40.0 TO 44.9 IN ADULT (H): Primary | ICD-10-CM

## 2023-06-30 PROCEDURE — 99215 OFFICE O/P EST HI 40 MIN: CPT | Mod: VID | Performed by: NURSE PRACTITIONER

## 2023-06-30 ASSESSMENT — ENCOUNTER SYMPTOMS: BREAST MASS: 0

## 2023-06-30 NOTE — PROGRESS NOTES
"Isabell is a 33 year old who is being evaluated via a billable video visit.      How would you like to obtain your AVS? MyChart  If the video visit is dropped, the invitation should be resent by: Text to cell phone: [phone]  Will anyone else be joining your video visit? No          Assessment & Plan     Class 3 severe obesity due to excess calories with serious comorbidity and body mass index (BMI) of 40.0 to 44.9 in adult (H)  Weight 247 lbs; down 13 lbs; continue dietary and lifestyle changes see below:     Estimated body mass index is 41.8 kg/m  as calculated from the following:    Height as of 4/27/23: 1.676 m (5' 6\").    Weight as of 4/27/23: 117.5 kg (259 lb).     Weight Managment Plan:   -  Continue exercise/activity program 30 minutes daily,   - daily caloric/cho/protein; meal tracking;    - reduce caloric intake 1600 per day;   - PRIMARY CARE FOLLOW-UP SCHEDULING  - Semaglutide-Weight Management (WEGOVY) 2.4 MG/0.75ML pen  Dispense: 3 mL; Refill: 3    Drug-induced constipation  - continue OTC stool softener daily      45 minutes spent by me on the date of the encounter doing patient visit            KOJO West CNP  [facility]    Subjective   Isabell is a 33 year old, presenting for the following health issues:  No chief complaint on file.        4/26/2022     3:00 PM   Additional Questions   Roomed by Cp     Healthy Habits:     Getting at least 3 servings of Calcium per day:  Yes    Bi-annual eye exam:  NO    Dental care twice a year:  Yes    Sleep apnea or symptoms of sleep apnea:  Daytime drowsiness    Diet:  Regular (no restrictions)    Frequency of exercise:  2-3 days/week    Duration of exercise:  30-45 minutes    Taking medications regularly:  Yes    Medication side effects:  Other    Additional concerns today:  No       Started wegovy 1.7 mg tolerating well; limited side effects except mild constipation taking stool sofener   current weight 247 lbs;   Goal " "199 lb     Diet:  Calories 1200-90829  Carbs: low  2 protein shakes per day     Activity: 1-2 times a week; walking and running with kid     /88   Pulse 90   Temp 98.1  F (36.7  C) (Temporal)   Resp 12   Ht 1.664 m (5' 5.5\")   Wt 119.3 kg (263 lb)   LMP 01/07/2023   SpO2 98%   Breastfeeding No   BMI 43.10 kg/m                 Review of Systems   Breasts:  Negative for tenderness, breast mass and discharge.   Genitourinary: Negative for pelvic pain, vaginal bleeding and vaginal discharge.            Objective           Vitals:  No vitals were obtained today due to virtual visit.    Physical Exam   GENERAL: Healthy, alert and no distress  EYES: Eyes grossly normal to inspection.  No discharge or erythema, or obvious scleral/conjunctival abnormalities.  RESP: No audible wheeze, cough, or visible cyanosis.  No visible retractions or increased work of breathing.    SKIN: Visible skin clear. No significant rash, abnormal pigmentation or lesions.  NEURO: Cranial nerves grossly intact.  Mentation and speech appropriate for age.  PSYCH: Mentation appears normal, affect normal/bright, judgement and insight intact, normal speech and appearance well-groomed.                Video-Visit Details    Type of service:  Video Visit     Originating Location (pt. Location): Home    Distant Location (provider location):  On-site  Platform used for Video Visit: Rohit"

## 2023-10-17 NOTE — TELEPHONE ENCOUNTER
Patient takes this for anxiety.  Prescription approved per [facility].  Tiarra Smith, RN  Triage Nurse   Cyclosporine Pregnancy And Lactation Text: This medication is Pregnancy Category C and it isn't know if it is safe during pregnancy. This medication is excreted in breast milk.

## 2023-10-30 ENCOUNTER — VIRTUAL VISIT (OUTPATIENT)
Dept: FAMILY MEDICINE | Facility: CLINIC | Age: 33
End: 2023-10-30
Payer: COMMERCIAL

## 2023-10-30 DIAGNOSIS — E66.01 CLASS 3 SEVERE OBESITY DUE TO EXCESS CALORIES WITH SERIOUS COMORBIDITY AND BODY MASS INDEX (BMI) OF 40.0 TO 44.9 IN ADULT (H): ICD-10-CM

## 2023-10-30 DIAGNOSIS — E66.813 CLASS 3 SEVERE OBESITY DUE TO EXCESS CALORIES WITH SERIOUS COMORBIDITY AND BODY MASS INDEX (BMI) OF 40.0 TO 44.9 IN ADULT (H): ICD-10-CM

## 2023-10-30 PROCEDURE — 99214 OFFICE O/P EST MOD 30 MIN: CPT | Mod: VID | Performed by: NURSE PRACTITIONER

## 2023-10-30 ASSESSMENT — ANXIETY QUESTIONNAIRES
GAD7 TOTAL SCORE: 5
6. BECOMING EASILY ANNOYED OR IRRITABLE: SEVERAL DAYS
7. FEELING AFRAID AS IF SOMETHING AWFUL MIGHT HAPPEN: NOT AT ALL
3. WORRYING TOO MUCH ABOUT DIFFERENT THINGS: SEVERAL DAYS
5. BEING SO RESTLESS THAT IT IS HARD TO SIT STILL: NOT AT ALL
1. FEELING NERVOUS, ANXIOUS, OR ON EDGE: SEVERAL DAYS
IF YOU CHECKED OFF ANY PROBLEMS ON THIS QUESTIONNAIRE, HOW DIFFICULT HAVE THESE PROBLEMS MADE IT FOR YOU TO DO YOUR WORK, TAKE CARE OF THINGS AT HOME, OR GET ALONG WITH OTHER PEOPLE: SOMEWHAT DIFFICULT
4. TROUBLE RELAXING: MORE THAN HALF THE DAYS
2. NOT BEING ABLE TO STOP OR CONTROL WORRYING: NOT AT ALL
GAD7 TOTAL SCORE: 5

## 2023-10-30 NOTE — PROGRESS NOTES
"Isabell is a 33 year old who is being evaluated via a billable video visit.      How would you like to obtain your AVS? MyChart  If the video visit is dropped, the invitation should be resent by: Send to e-mail at: [email or web address]  Will anyone else be joining your video visit? No          Assessment & Plan     Class 3 severe obesity due to excess calories with serious comorbidity and body mass index (BMI) of 40.0 to 44.9 in adult (H)  Starting weight 260 lbs today down 28 lbs current weight 232 lbs   - Semaglutide-Weight Management (WEGOVY) 2.4 MG/0.75ML pen  Dispense: 3 mL; Refill: 3               BMI:   Estimated body mass index is 41.8 kg/m  as calculated from the following:    Height as of 4/27/23: 1.676 m (5' 6\").    Weight as of 4/27/23: 117.5 kg (259 lb).           KOJO West CNP  [facility]    Subjective   Isabell is a 33 year old, presenting for the following health issues:  Follow Up (Follow Up )        10/30/2023     3:51 PM   Additional Questions   Roomed by Dionna Bearden       History of Present Illness       Reason for visit:  Med Check    She eats 2-3 servings of fruits and vegetables daily.She consumes 0 sweetened beverage(s) daily.She exercises with enough effort to increase her heart rate 30 to 60 minutes per day.  She exercises with enough effort to increase her heart rate 4 days per week.   She is taking medications regularly.   Weight 247 lbs; down 13 lbs; continue dietary and lifestyle changes see below:      Estimated body mass index is 41.8 kg/m  as calculated from the following:    Height as of 4/27/23: 1.676 m (5' 6\").    Weight as of 4/27/23: 117.5 kg (259 lb).      Weight Managment Plan:   -  Continue exercise/activity program 30 minutes daily,   - daily caloric/cho/protein; meal tracking;    - reduce caloric intake 1600 per day;   - PRIMARY CARE FOLLOW-UP SCHEDULING  - Semaglutide-Weight Management (WEGOVY) 2.4 MG/0.75ML pen  Dispense: 3 mL; " Refill: 3    TODAY     232 lbs today  Protein 30-35 per day  Carbs: 50 gm per meal   Calories: 1200   Craving sweets takes small bites of gummy bears; sweet soda; occurs mostly around menstrual cycle     Depression/Anxiety: Following mental health team  Currently on lexapro, wellbutrin   S/p Gastric bypass: 2019; 260 lbs down to 234; 4/2022 regained with starting lexapro in 12/2021 by 4/2022 weight  255 lbs              Review of Systems         Objective           Vitals:  No vitals were obtained today due to virtual visit.    Physical Exam   GENERAL: Healthy, alert and no distress  EYES: Eyes grossly normal to inspection.  No discharge or erythema, or obvious scleral/conjunctival abnormalities.  RESP: No audible wheeze, cough, or visible cyanosis.  No visible retractions or increased work of breathing.    SKIN: Visible skin clear. No significant rash, abnormal pigmentation or lesions.  NEURO: Cranial nerves grossly intact.  Mentation and speech appropriate for age.  PSYCH: Mentation appears normal, affect normal/bright, judgement and insight intact, normal speech and appearance well-groomed.                Video-Visit Details    Type of service:  Video Visit   Joined the call at 10/30/2023, 5:29:29 pm.  Left the call at 10/30/2023, 5:52:28 pm.  You were on the call for 22 minutes 58 seconds .    Originating Location (pt. Location): Home    Distant Location (provider location):  On-site  Platform used for Video Visit: [employer]

## 2024-01-04 ENCOUNTER — PATIENT OUTREACH (OUTPATIENT)
Dept: CARE COORDINATION | Facility: CLINIC | Age: 34
End: 2024-01-04
Payer: COMMERCIAL

## 2024-01-18 ENCOUNTER — PATIENT OUTREACH (OUTPATIENT)
Dept: CARE COORDINATION | Facility: CLINIC | Age: 34
End: 2024-01-18
Payer: COMMERCIAL

## 2024-01-29 ENCOUNTER — TELEPHONE (OUTPATIENT)
Dept: FAMILY MEDICINE | Facility: CLINIC | Age: 34
End: 2024-01-29
Payer: COMMERCIAL

## 2024-01-29 NOTE — TELEPHONE ENCOUNTER
PA started for Semaglutide-Weight Management (WEGOVY) 2.4 MG/0.75ML pen .  Log into [email or web address] and enter info from below:    Key: [id number]  Patient last name: Thor MELTON: [birth year]

## 2024-02-09 NOTE — TELEPHONE ENCOUNTER
Retail Pharmacy Prior Authorization Team   Phone: [phone]    PA Initiation    Medication: WEGOVY 2.4 MG/0.75ML SC SOAJ  Insurance Company: [employer] - Phone [phone] Fax [phone]  Pharmacy Filling the Rx: [id number] - [place], [place] - [address]  AT [address]  Filling Pharmacy Phone: [phone]  Filling Pharmacy Fax:    Start Date: 2/9/2024      *KEY PROVIDED ON 01/29/2024 WAS NOT A VALID KEY.    Note: Due to record-high volumes, our turn-around is time taking longer than normal . We are currently 8 days behind in the pools.   We are working diligently to submit all requests in a timely manner and in the order they are received. Please only flag true urgent requests as high priority to the pool at this time.   If you have questions - please send a note/message in the active PA encounter and send back to the RPPA (Retail Pharmacy Prior Authorization) team [[phone]].    If you have more specific questions about our process please reach out to our supervisor Michell Dumont.   Thank you!

## 2024-02-13 ENCOUNTER — OFFICE VISIT (OUTPATIENT)
Dept: FAMILY MEDICINE | Facility: CLINIC | Age: 34
End: 2024-02-13
Payer: COMMERCIAL

## 2024-02-13 VITALS
WEIGHT: 226 LBS | RESPIRATION RATE: 15 BRPM | HEART RATE: 96 BPM | TEMPERATURE: 97.1 F | SYSTOLIC BLOOD PRESSURE: 112 MMHG | HEIGHT: 66 IN | DIASTOLIC BLOOD PRESSURE: 78 MMHG | OXYGEN SATURATION: 99 % | BODY MASS INDEX: 36.32 KG/M2

## 2024-02-13 DIAGNOSIS — F32.5 MAJOR DEPRESSIVE DISORDER WITH SINGLE EPISODE, IN FULL REMISSION (H): ICD-10-CM

## 2024-02-13 DIAGNOSIS — R79.89 ELEVATED FERRITIN LEVEL: ICD-10-CM

## 2024-02-13 DIAGNOSIS — M54.16 LUMBAR RADICULOPATHY: ICD-10-CM

## 2024-02-13 DIAGNOSIS — Z00.00 ROUTINE GENERAL MEDICAL EXAMINATION AT A HEALTH CARE FACILITY: Primary | ICD-10-CM

## 2024-02-13 DIAGNOSIS — Z23 NEED FOR HPV VACCINE: ICD-10-CM

## 2024-02-13 DIAGNOSIS — L29.9 LOCALIZED PRURITUS: ICD-10-CM

## 2024-02-13 DIAGNOSIS — E66.813 CLASS 3 SEVERE OBESITY DUE TO EXCESS CALORIES WITH SERIOUS COMORBIDITY AND BODY MASS INDEX (BMI) OF 40.0 TO 44.9 IN ADULT (H): ICD-10-CM

## 2024-02-13 DIAGNOSIS — E66.01 CLASS 3 SEVERE OBESITY DUE TO EXCESS CALORIES WITH SERIOUS COMORBIDITY AND BODY MASS INDEX (BMI) OF 40.0 TO 44.9 IN ADULT (H): ICD-10-CM

## 2024-02-13 DIAGNOSIS — F41.1 GAD (GENERALIZED ANXIETY DISORDER): ICD-10-CM

## 2024-02-13 DIAGNOSIS — Z13.1 ENCOUNTER FOR SCREENING FOR DIABETES MELLITUS: ICD-10-CM

## 2024-02-13 DIAGNOSIS — Z82.49 FAMILY HISTORY OF ISCHEMIC HEART DISEASE: ICD-10-CM

## 2024-02-13 DIAGNOSIS — E78.2 MODERATE MIXED HYPERLIPIDEMIA NOT REQUIRING STATIN THERAPY: ICD-10-CM

## 2024-02-13 DIAGNOSIS — Z98.84 BARIATRIC SURGERY STATUS: ICD-10-CM

## 2024-02-13 DIAGNOSIS — K21.00 GASTROESOPHAGEAL REFLUX DISEASE WITH ESOPHAGITIS WITHOUT HEMORRHAGE: ICD-10-CM

## 2024-02-13 PROBLEM — I27.20 PULMONARY HYPERTENSION (H): Status: RESOLVED | Noted: 2019-07-08 | Resolved: 2024-02-13

## 2024-02-13 LAB
ERYTHROCYTE [DISTWIDTH] IN BLOOD BY AUTOMATED COUNT: 12.3 % (ref 10–15)
FOLATE SERPL-MCNC: 8.6 NG/ML (ref 4.6–34.8)
HBA1C MFR BLD: 5.2 % (ref 0–5.6)
HCT VFR BLD AUTO: 39.9 % (ref 35–47)
HGB BLD-MCNC: 13.2 G/DL (ref 11.7–15.7)
MCH RBC QN AUTO: 31.1 PG (ref 26.5–33)
MCHC RBC AUTO-ENTMCNC: 33.1 G/DL (ref 31.5–36.5)
MCV RBC AUTO: 94 FL (ref 78–100)
PLATELET # BLD AUTO: 265 10E3/UL (ref 150–450)
RBC # BLD AUTO: 4.24 10E6/UL (ref 3.8–5.2)
WBC # BLD AUTO: 7.6 10E3/UL (ref 4–11)

## 2024-02-13 PROCEDURE — 85027 COMPLETE CBC AUTOMATED: CPT | Performed by: NURSE PRACTITIONER

## 2024-02-13 PROCEDURE — 84443 ASSAY THYROID STIM HORMONE: CPT | Performed by: NURSE PRACTITIONER

## 2024-02-13 PROCEDURE — 82607 VITAMIN B-12: CPT | Performed by: NURSE PRACTITIONER

## 2024-02-13 PROCEDURE — 82746 ASSAY OF FOLIC ACID SERUM: CPT | Performed by: NURSE PRACTITIONER

## 2024-02-13 PROCEDURE — 82306 VITAMIN D 25 HYDROXY: CPT | Performed by: NURSE PRACTITIONER

## 2024-02-13 PROCEDURE — 83036 HEMOGLOBIN GLYCOSYLATED A1C: CPT | Performed by: NURSE PRACTITIONER

## 2024-02-13 PROCEDURE — 36415 COLL VENOUS BLD VENIPUNCTURE: CPT | Performed by: NURSE PRACTITIONER

## 2024-02-13 PROCEDURE — 80061 LIPID PANEL: CPT | Performed by: NURSE PRACTITIONER

## 2024-02-13 PROCEDURE — 99395 PREV VISIT EST AGE 18-39: CPT | Mod: 25 | Performed by: NURSE PRACTITIONER

## 2024-02-13 PROCEDURE — 83550 IRON BINDING TEST: CPT | Performed by: NURSE PRACTITIONER

## 2024-02-13 PROCEDURE — 80053 COMPREHEN METABOLIC PANEL: CPT | Performed by: NURSE PRACTITIONER

## 2024-02-13 PROCEDURE — 90471 IMMUNIZATION ADMIN: CPT | Performed by: NURSE PRACTITIONER

## 2024-02-13 PROCEDURE — 90651 9VHPV VACCINE 2/3 DOSE IM: CPT | Performed by: NURSE PRACTITIONER

## 2024-02-13 PROCEDURE — 83540 ASSAY OF IRON: CPT | Performed by: NURSE PRACTITIONER

## 2024-02-13 PROCEDURE — 99215 OFFICE O/P EST HI 40 MIN: CPT | Mod: 25 | Performed by: NURSE PRACTITIONER

## 2024-02-13 PROCEDURE — 82728 ASSAY OF FERRITIN: CPT | Performed by: NURSE PRACTITIONER

## 2024-02-13 RX ORDER — HYDROXYZINE HYDROCHLORIDE 25 MG/1
12.5-25 TABLET, FILM COATED ORAL
Qty: 30 TABLET | Refills: 3 | Status: SHIPPED | OUTPATIENT
Start: 2024-02-13

## 2024-02-13 RX ORDER — OMEPRAZOLE 40 MG/1
40 CAPSULE, DELAYED RELEASE ORAL 2 TIMES DAILY
Qty: 112 CAPSULE | Refills: 0 | Status: SHIPPED | OUTPATIENT
Start: 2024-02-13 | End: 2024-04-09

## 2024-02-13 RX ORDER — GABAPENTIN 100 MG/1
100-300 CAPSULE ORAL 3 TIMES DAILY
Qty: 270 CAPSULE | Refills: 0 | Status: SHIPPED | OUTPATIENT
Start: 2024-02-13

## 2024-02-13 SDOH — HEALTH STABILITY: PHYSICAL HEALTH: ON AVERAGE, HOW MANY DAYS PER WEEK DO YOU ENGAGE IN MODERATE TO STRENUOUS EXERCISE (LIKE A BRISK WALK)?: 2 DAYS

## 2024-02-13 SDOH — HEALTH STABILITY: PHYSICAL HEALTH: ON AVERAGE, HOW MANY MINUTES DO YOU ENGAGE IN EXERCISE AT THIS LEVEL?: 30 MIN

## 2024-02-13 ASSESSMENT — PATIENT HEALTH QUESTIONNAIRE - PHQ9
10. IF YOU CHECKED OFF ANY PROBLEMS, HOW DIFFICULT HAVE THESE PROBLEMS MADE IT FOR YOU TO DO YOUR WORK, TAKE CARE OF THINGS AT HOME, OR GET ALONG WITH OTHER PEOPLE: SOMEWHAT DIFFICULT
SUM OF ALL RESPONSES TO PHQ QUESTIONS 1-9: 3
SUM OF ALL RESPONSES TO PHQ QUESTIONS 1-9: 3

## 2024-02-13 ASSESSMENT — SOCIAL DETERMINANTS OF HEALTH (SDOH): HOW OFTEN DO YOU GET TOGETHER WITH FRIENDS OR RELATIVES?: ONCE A WEEK

## 2024-02-13 ASSESSMENT — PAIN SCALES - GENERAL: PAINLEVEL: NO PAIN (0)

## 2024-02-13 NOTE — TELEPHONE ENCOUNTER
SHINE Geronimo denied as weight loss drugs are not covered. Please advise if you recommend an alternative. Thank you.    Mariam Quijano, RN, BSN, PHN  [facility]

## 2024-02-13 NOTE — PATIENT INSTRUCTIONS
Your Health Risk Assessment indicates you feel you are not in good health    A healthy lifestyle helps keep the body fit and the mind alert. It helps protect you from disease, helps you fight disease, and helps prevent chronic disease (disease that doesn't go away) from getting worse. This is important as you get older and begin to notice twinges in muscles and joints and a decline in the strength and stamina you once took for granted. A healthy lifestyle includes good healthcare, good nutrition, weight control, recreation, and regular exercise. Avoid harmful substances and do what you can to keep safe. Another part of a healthy lifestyle is stay mentally active and socially involved.    Good healthcare   Have a wellness visit every year.   If you have new symptoms, let us know right away. Don't wait until the next checkup.   Take medicines exactly as prescribed and keep your medicines in a safe place. Tell us if your medicine causes problems.   Healthy diet and weight control   Eat 3 or 4 small, nutritious, low-fat, high-fiber meals a day. Include a variety of fruits, vegetables, and whole-grain foods.   Make sure you get enough calcium in your diet. Calcium, vitamin D, and exercise help prevent osteoporosis (bone thinning).   If you live alone, try eating with others when you can. That way you get a good meal and have company while you eat it.   Try to keep a healthy weight. If you eat more calories than your body uses for energy, it will be stored as fat and you will gain weight.     Recreation   Recreation is not limited to sports and team events. It includes any activity that provides relaxation, interest, enjoyment, and exercise. Recreation provides an outlet for physical, mental, and social energy. It can give a sense of worth and achievement. It can help you stay healthy.    Mental Exercise and Social Involvement  Mental and emotional health is as important as physical health. Keep in touch with friends and  family. Stay as active as possible. Continue to learn and challenge yourself.   Things you can do to stay mentally active are:  Learn something new, like a foreign language or musical instrument.   Play SCRABBLE or do crossword puzzles. If you cannot find people to play these games with you at home, you can play them with others on your computer through the Internet.   Join a games club--anything from card games to chess or checkers or lawn bowling.   Start a new hobby.   Go back to school.   Volunteer.   Read.   Keep up with world events.  Eating Healthy Foods: Care Instructions  With every meal, you can make healthy food choices. Try to eat a variety of fruits, vegetables, whole grains, lean proteins, and low-fat dairy products. This can help you get the right balance of nutrients, including vitamins and minerals. Small changes add up over time. You can start by adding one healthy food to your meals each day.    Try to make half your plate fruits and vegetables, one-fourth whole grains, and one-fourth lean proteins. Try including dairy with your meals.   Eat more fruits and vegetables. Try to have them with most meals and snacks.   Foods for healthy eating    Fruits    These can be fresh, frozen, canned, or dried.  Try to choose whole fruit rather than fruit juice.  Eat a variety of colors.    Vegetables    These can be fresh, frozen, canned, or dried.  Beans, peas, and lentils count too.    Whole grains    Choose whole-grain breads, cereals, and noodles.  Try brown rice.    Lean proteins    These can include lean meat, poultry, fish, and eggs.  You can also have tofu, beans, peas, lentils, nuts, and seeds.    Dairy    Try milk, yogurt, and cheese.  Choose low-fat or fat-free when you can.  If you need to, use lactose-free milk or fortified plant-based milk products, such as soy milk.    Water    Drink water when you're thirsty.  Limit sugar-sweetened drinks, including soda, fruit drinks, and sports drinks.  Where  "can you learn more?  Go to https://www.[employer]ict[employer].net/patiented  Enter T756 in the search box to learn more about \"Eating Healthy Foods: Care Instructions.\"  Current as of: February 28, 2023               Content Version: 13.8    1720-6179 [employer].   Care instructions adapted under license by your healthcare professional. If you have questions about a medical condition or this instruction, always ask your healthcare professional. [employer] disclaims any warranty or liability for your use of this information.      Learning About Stress  What is stress?     Stress is your body's response to a hard situation. Your body can have a physical, emotional, or mental response. Stress is a fact of life for most people, and it affects everyone differently. What causes stress for you may not be stressful for someone else.  A lot of things can cause stress. You may feel stress when you go on a job interview, take a test, or run a race. This kind of short-term stress is normal and even useful. It can help you if you need to work hard or react quickly. For example, stress can help you finish an important job on time.  Long-term stress is caused by ongoing stressful situations or events. Examples of long-term stress include long-term health problems, ongoing problems at work, or conflicts in your family. Long-term stress can harm your health.  How does stress affect your health?  When you are stressed, your body responds as though you are in danger. It makes hormones that speed up your heart, make you breathe faster, and give you a burst of energy. This is called the fight-or-flight stress response. If the stress is over quickly, your body goes back to normal and no harm is done.  But if stress happens too often or lasts too long, it can have bad effects. Long-term stress can make you more likely to get sick, and it can make symptoms of some diseases worse. If you tense up when you are stressed, you " may develop neck, shoulder, or low back pain. Stress is linked to high blood pressure and heart disease.  Stress also harms your emotional health. It can make you bauman, tense, or depressed. Your relationships may suffer, and you may not do well at work or school.  What can you do to manage stress?  You can try these things to help manage stress:   Do something active. Exercise or activity can help reduce stress. Walking is a great way to get started. Even everyday activities such as housecleaning or yard work can help.  Try yoga or jeana chi. These techniques combine exercise and meditation. You may need some training at first to learn them.  Do something you enjoy. For example, listen to music or go to a movie. Practice your hobby or do volunteer work.  Meditate. This can help you relax, because you are not worrying about what happened before or what may happen in the future.  Do guided imagery. Imagine yourself in any setting that helps you feel calm. You can use online videos, books, or a teacher to guide you.  Do breathing exercises. For example:  From a standing position, bend forward from the waist with your knees slightly bent. Let your arms dangle close to the floor.  Breathe in slowly and deeply as you return to a standing position. Roll up slowly and lift your head last.  Hold your breath for just a few seconds in the standing position.  Breathe out slowly and bend forward from the waist.  Let your feelings out. Talk, laugh, cry, and express anger when you need to. Talking with supportive friends or family, a counselor, or a bernabe leader about your feelings is a healthy way to relieve stress. Avoid discussing your feelings with people who make you feel worse.  Write. It may help to write about things that are bothering you. This helps you find out how much stress you feel and what is causing it. When you know this, you can find better ways to cope.  What can you do to prevent stress?  You might try some of  "these things to help prevent stress:  Manage your time. This helps you find time to do the things you want and need to do.  Get enough sleep. Your body recovers from the stresses of the day while you are sleeping.  Get support. Your family, friends, and community can make a difference in how you experience stress.  Limit your news feed. Avoid or limit time on social media or news that may make you feel stressed.  Do something active. Exercise or activity can help reduce stress. Walking is a great way to get started.  Where can you learn more?  Go to https://www.[employer].net/patiented  Enter N032 in the search box to learn more about \"Learning About Stress.\"  Current as of: February 26, 2023               Content Version: 13.8    8977-2472 [employer].   Care instructions adapted under license by your healthcare professional. If you have questions about a medical condition or this instruction, always ask your healthcare professional. [employer] disclaims any warranty or liability for your use of this information.      Preventive Care Advice   This is general advice given by our system to help you stay healthy. However, your care team may have specific advice just for you. Please talk to your care team about your preventive care needs.  Nutrition  Eat 5 or more servings of fruits and vegetables each day.  Try wheat bread, brown rice and whole grain pasta (instead of white bread, rice, and pasta).  Get enough calcium and vitamin D. Check the label on foods and aim for 100% of the RDA (recommended daily allowance).  Lifestyle  Exercise at least 150 minutes each week  (30 minutes a day, 5 days a week).  Do muscle strengthening activities 2 days a week. These help control your weight and prevent disease.  No smoking.  Wear sunscreen to prevent skin cancer.  Have a dental exam and cleaning every 6 months.  Yearly exams  See your health care team every year to talk about:  Any changes in your " health.  Any medicines your care team has prescribed.  Preventive care, family planning, and ways to prevent chronic diseases.  Shots (vaccines)   HPV shots (up to age 26), if you've never had them before.  Hepatitis B shots (up to age 59), if you've never had them before.  COVID-19 shot: Get this shot when it's due.  Flu shot: Get a flu shot every year.  Tetanus shot: Get a tetanus shot every 10 years.  Pneumococcal, hepatitis A, and RSV shots: Ask your care team if you need these based on your risk.  Shingles shot (for age 50 and up)  General health tests  Diabetes screening:  Starting at age 35, Get screened for diabetes at least every 3 years.  If you are younger than age 35, ask your care team if you should be screened for diabetes.  Cholesterol test: At age 39, start having a cholesterol test every 5 years, or more often if advised.  Bone density scan (DEXA): At age 50, ask your care team if you should have this scan for osteoporosis (brittle bones).  Hepatitis C: Get tested at least once in your life.  STIs (sexually transmitted infections)  Before age 24: Ask your care team if you should be screened for STIs.  After age 24: Get screened for STIs if you're at risk. You are at risk for STIs (including HIV) if:  You are sexually active with more than one person.  You don't use condoms every time.  You or a partner was diagnosed with a sexually transmitted infection.  If you are at risk for HIV, ask about PrEP medicine to prevent HIV.  Get tested for HIV at least once in your life, whether you are at risk for HIV or not.  Cancer screening tests  Cervical cancer screening: If you have a cervix, begin getting regular cervical cancer screening tests starting at age 21.  Breast cancer scan (mammogram): If you've ever had breasts, begin having regular mammograms starting at age 40. This is a scan to check for breast cancer.  Colon cancer screening: It is important to start screening for colon cancer at age 45.  Have  a colonoscopy test every 10 years (or more often if you're at risk) Or, ask your provider about stool tests like a FIT test every year or Cologuard test every 3 years.  To learn more about your testing options, visit:   [email or web address].  For help making a decision, visit:   https://bit.Keypr/sc35109.  Prostate cancer screening test: If you have a prostate, ask your care team if a prostate cancer screening test (PSA) at age 55 is right for you.  Lung cancer screening: If you are a current or former smoker ages 50 to 80, ask your care team if ongoing lung cancer screenings are right for you.  For informational purposes only. Not to replace the advice of your health care provider. Copyright   2023 [employer]. All rights reserved. Clinically reviewed by the [facility]. CorkCRM [id number] - REV 01/24.

## 2024-02-13 NOTE — TELEPHONE ENCOUNTER
PRIOR AUTHORIZATION DENIED    Medication: WEGOVY 2.4 MG/0.75ML SC SOAJ  Insurance Company: [employer] - Phone [phone] Fax [phone]  Denial Date: 2/12/2024  Denial Reason(s): WEIGHT LOSS DRUGS NOT COVERED UNDER PHARMACY BENEFIT  Appeal Information: If the provider would like to appeal we will need a detailed   letter of medical necessity with clinical reasoning to start the process.   Patient Notified: NO

## 2024-02-13 NOTE — RESULT ENCOUNTER NOTE
Zeferino Whyte,    Your recent results are normal. Any results slightly above or below the normal range have been evaluated as clinically stable.     Let me know if you have any questions or concerns.    Sincerely,  KOJO West CNP

## 2024-02-13 NOTE — NURSING NOTE
Prior to immunization administration, verified patients identity using patient s name and date of birth. Please see Immunization Activity for additional information.     Screening Questionnaire for Adult Immunization    Are you sick today?   No   Do you have allergies to medications, food, a vaccine component or latex?   No   Have you ever had a serious reaction after receiving a vaccination?   No   Do you have a long-term health problem with heart, lung, kidney, or metabolic disease (e.g., diabetes), asthma, a blood disorder, no spleen, complement component deficiency, a cochlear implant, or a spinal fluid leak?  Are you on long-term aspirin therapy?   No   Do you have cancer, leukemia, HIV/AIDS, or any other immune system problem?   No   Do you have a parent, brother, or sister with an immune system problem?   No   In the past 3 months, have you taken medications that affect  your immune system, such as prednisone, other steroids, or anticancer drugs; drugs for the treatment of rheumatoid arthritis, Crohn s disease, or psoriasis; or have you had radiation treatments?   No   Have you had a seizure, or a brain or other nervous system problem?   No   During the past year, have you received a transfusion of blood or blood    products, or been given immune (gamma) globulin or antiviral drug?   No   For women: Are you pregnant or is there a chance you could become       pregnant during the next month?   No   Have you received any vaccinations in the past 4 weeks?   No     Immunization questionnaire answers were all negative.      Patient instructed to remain in clinic for 15 minutes afterwards, and to report any adverse reactions.     Screening performed by Christelle Vu on 2/13/2024 at 4:14 PM.

## 2024-02-13 NOTE — PROGRESS NOTES
Preventive Care Visit  [facility]  KOJO West CNP, Family Medicine  Feb 13, 2024    Assessment & Plan     Routine general medical examination at a health care facility  Preventative exam w/no abnormalities and/or concerns listed in diagnoses; discussed health maintenance screenings including prostate, breast, cervical and colorectal ca screenings related to gender;  reviewed and reconciled medication, medical history and patient related health concerns  Plan: obtain metabolic labs      Lumbar radiculopathy  Stable; continue current regimen; renewed medication  - gabapentin (NEURONTIN) 100 MG capsule  Dispense: 270 capsule; Refill: 0  - Vitamin B12    Gastroesophageal reflux disease with esophagitis without hemorrhage  Hiatal hernia repair 2019; ongoing uncontrolled reflux; failed omeprazole 20 mg BID; increase to 40 mg BID for 6 weeks then reduce to 20-40 mg q evening; if continues plan EGD  - omeprazole (PRILOSEC) 40 MG DR capsule  Dispense: 112 capsule; Refill: 0  - CBC with platelets  - Iron & Iron Binding Capacity  - Vitamin B12  - Comprehensive metabolic panel  - PRIMARY CARE FOLLOW-UP SCHEDULING    Class 3 severe obesity due to excess calories with serious comorbidity and body mass index (BMI) of 40.0 to 44.9 in adult (H)  Bariatric surgery status  S/p gastric sleeve;  lbs then regained weight to 263 BMI 43 ; started GLP1 down 40 lbs BMI 36 ; WT today 226 lbs ; continue glp and lifestyle changes; check nutritional status   - Semaglutide-Weight Management (WEGOVY) 2.4 MG/0.75ML pen  Dispense: 3 mL; Refill: 3  - Hemoglobin A1c  - Comprehensive metabolic panel  - CBC with platelets  - Vitamin B12  - Vitamin D Deficiency  - Comprehensive metabolic panel  - Ferritin  - CBC with platelets  - Iron & Iron Binding Capacity  - Vitamin B12  - Vitamin D Deficiency  - Folate  - PRIMARY CARE FOLLOW-UP SCHEDULING    Family history of ischemic heart disease  ; significant  "family cardiac hx; obtain calcium score; discussed potential to start statin ? HoFH   - CT Coronary Calcium Scan  - Lipid Profile  - Comprehensive metabolic panel      Encounter for screening for diabetes mellitus  - Hemoglobin A1c  - Comprehensive metabolic panel  - Vitamin B12  - Vitamin D Deficiency  - Hemoglobin A1c    NICKOLAS (generalized anxiety disorder)  Major depressive disorder with single episode, in full remission (H24)  Stable; continue current regimen; renewed medication  Referral for counseling  - TSH with free T4 reflex  - Adult Mental Health  Referral    Localized pruritus   - hydrOXYzine HCl (ATARAX) 25 MG tablet  Dispense: 30 tablet; Refill: 3    Need for HPV vaccine  - HPV 9Y+ (Gardasil 9)    Moderate mixed hyperlipidemia not requiring statin therapy   ? HoFH ; family hx hld, cad,   - Lipid Profile  - Comprehensive metabolic panel    Elevated ferritin level  - Ferritin  - CBC with platelets  - Iron & Iron Binding Capacity    Follow up in 3 months GERD/weight    In addition to the preventive visit, 45  minutes of the appointment were spent evaluating and developing a treatment plan for her additional concern(s).            BMI  Estimated body mass index is 36.48 kg/m  as calculated from the following:    Height as of this encounter: 1.676 m (5' 6\").    Weight as of this encounter: 102.5 kg (226 lb).       Counseling  Appropriate preventive services were discussed with this patient, including applicable screening as appropriate for fall prevention, nutrition, physical activity, Tobacco-use cessation, weight loss and cognition.  Checklist reviewing preventive services available has been given to the patient.  Reviewed patient's diet, addressing concerns and/or questions.   She is at risk for lack of exercise and has been provided with information to increase physical activity for the benefit of her well-being.   She is at risk for psychosocial distress and has been provided with " information to reduce risk.           Patient has been advised of split billing requirements and indicates understanding: Yes  In addition to the preventive visit, 45  minutes of the appointment were spent evaluating and developing a treatment plan for her additional concern(s).        Wilbert Whyte is a 34 year old, presenting for the following:  Physical (Physical )        2/13/2024     3:06 PM   Additional Questions   Roomed by Dionna Bearden         2/13/2024     3:06 PM   Patient Reported Additional Medications   Patient reports taking the following new medications buPROPion (WELLBUTRIN XL) 150 MG 24 hr tablet        Health Care Directive  Patient does not have a Health Care Directive or Living Will: Discussed advance care planning with patient; however, patient declined at this time.    Healthy Habits:     Getting at least 3 servings of Calcium per day:  Yes    Bi-annual eye exam:  NO    Dental care twice a year:  Yes    Sleep apnea or symptoms of sleep apnea:  None    Diet:  Regular (no restrictions)    Frequency of exercise:  2-3 days/week    Duration of exercise:  30-45 minutes    Taking medications regularly:  Yes    Barriers to taking medications:  None    Medication side effects:  Not applicable    Additional concerns today:  Yes    34 year old year old female  with PMH   Patient Active Problem List   Diagnosis Code    Anxiety F41.9    Acute blood loss anemia D62    Bariatric surgery status Z98.84    Dysmenorrhea N94.6    Excessive daytime sleepiness G47.19    Fever of unknown origin following delivery, postpartum O86.4    Migraine headache with aura G43.109    Morbid obesity with BMI of 40.0-44.9, adult (H) E66.01, Z68.41    Non-rheumatic mitral regurgitation I34.0    Presence of intrauterine contraceptive device (IUD) Z97.5    Elevated BP without diagnosis of hypertension R03.0    Cervical high risk HPV (human papillomavirus) test positive R87.810    in clinic for preventive health care exam.     -  GERD: increase 40 mg   - Mental health: [facility]  - Weight management s/p laparoscopic sleeve gastrectomy 2019 succesful weight loss did well with weight loss with the first year and now regained weight; 2018 ->225 lb; slowing regaining over 3 yrs now 263vlbs  Activity: 45-60 minutes gym   Goal weight: 200 lbs  - HLD: ldl 139  Maternal GF MI   Mother and father htn  Father HLD       Family History   Problem Relation Age of Onset    Hypertension Mother    Obesity Mother    Depression Mother    Hyperlipidemia Father    Hypertension Father    Obesity Father    Other Maternal Grandfather 69   Alzheimers, heart attack    Hypertension Maternal Grandfather    Clotting disorder Maternal Grandfather    Hyperlipidemia Paternal Grandfather    Hypertension Paternal Grandfather    Obesity Paternal Grandfather    Cancer Paternal Grandfather    Hypertension Paternal Grandmother    Arthritis Maternal Grandmother 70    Diabetes Maternal Grandmother    Stroke Maternal Grandmother                 2/13/2024   General Health   How would you rate your overall physical health? (!) FAIR   Feel stress (tense, anxious, or unable to sleep) Only a little   (!) STRESS CONCERN      2/13/2024   Nutrition   Three or more servings of calcium each day? Yes   Diet: Regular (no restrictions)   How many servings of fruit and vegetables per day? (!) 2-3   How many sweetened beverages each day? 0-1         2/13/2024   Exercise   Days per week of moderate/strenous exercise 2 days   Average minutes spent exercising at this level 30 min   (!) EXERCISE CONCERN      2/13/2024   Social Factors   Frequency of gathering with friends or relatives Once a week   Worry food won't last until get money to buy more No   Food not last or not have enough money for food? No   Do you have housing?  Yes   Are you worried about losing your housing? No   Lack of transportation? No   Unable to get utilities (heat,electricity)? No         2/13/2024    Dental   Dentist two times every year? Yes         2/13/2024   TB Screening   Were you born outside of [place]?  No       Today's PHQ-9 Score:       2/13/2024     1:29 PM   PHQ-9 SCORE   PHQ-9 Total Score MyChart 3 (Minimal depression)   PHQ-9 Total Score 3         2/13/2024   Substance Use   Alcohol more than 3/day or more than 7/wk No   Do you use any other substances recreationally? No     Social History     Tobacco Use    Smoking status: Never    Smokeless tobacco: Never    Tobacco comments:     Not applicable   Vaping Use    Vaping Use: Never used   Substance Use Topics    Alcohol use: Not Currently    Drug use: No             2/13/2024   Breast Cancer Screening   Family history of breast, colon, or ovarian cancer? No / Unknown              2/13/2024   STI Screening   New sexual partner(s) since last STI/HIV test? No     History of abnormal Pap smear: NO - age 30-65 PAP every 5 years with negative HPV co-testing recommended        Latest Ref Rng & Units 4/27/2023     2:48 PM   PAP / HPV   PAP  Negative for Intraepithelial Lesion or Malignancy (NILM)    HPV 16 DNA Negative Negative    HPV 18 DNA Negative Negative    Other HR HPV Negative Positive            2/13/2024   Contraception/Family Planning   Questions about contraception or family planning No        Reviewed and updated as needed this visit by Provider   Tobacco  Allergies  Meds  Problems  Med Hx  Surg Hx  Fam Hx            Past Medical History:   Diagnosis Date    Anxiety     Depression     Depressive disorder 09/01/2020    Diabetes (H) Gestational 2016         Review of Systems  CONSTITUTIONAL: NEGATIVE for fever, chills, change in weight  INTEGUMENTARY/SKIN: NEGATIVE for worrisome rashes, moles or lesions  EYES: NEGATIVE for vision changes or irritation  ENT/MOUTH: NEGATIVE for ear, mouth and throat problems  RESP: NEGATIVE for significant cough or SOB  BREAST: NEGATIVE for masses, tenderness or discharge  CV: NEGATIVE for chest pain,  "palpitations or peripheral edema  GI: POSITIVE for constipation, heartburn or reflux, and Hx GERD  : NEGATIVE for frequency, dysuria, or hematuria  MUSCULOSKELETAL: NEGATIVE for significant arthralgias or myalgia  NEURO: NEGATIVE for weakness, dizziness or paresthesias  ENDOCRINE: NEGATIVE for temperature intolerance, skin/hair changes  HEME: NEGATIVE for bleeding problems  PSYCHIATRIC: NEGATIVE for changes in mood or affect     Objective    Exam  /78 (BP Location: Right arm, Patient Position: Sitting, Cuff Size: Adult Large)   Pulse 96   Temp 97.1  F (36.2  C) (Temporal)   Resp 15   Ht 1.676 m (5' 6\")   Wt 102.5 kg (226 lb)   SpO2 99%   BMI 36.48 kg/m     Estimated body mass index is 36.48 kg/m  as calculated from the following:    Height as of this encounter: 1.676 m (5' 6\").    Weight as of this encounter: 102.5 kg (226 lb).    Physical Exam  GENERAL: alert and no distress  EYES: Eyes grossly normal to inspection, PERRL and conjunctivae and sclerae normal  HENT: ear canals and TM's normal, nose and mouth without ulcers or lesions  NECK: no adenopathy, no asymmetry, masses, or scars  RESP: lungs clear to auscultation - no rales, rhonchi or wheezes  BREAST: normal without masses, tenderness or nipple discharge and no palpable axillary masses or adenopathy  CV: regular rate and rhythm, normal S1 S2, no S3 or S4, no murmur, click or rub, no peripheral edema  ABDOMEN: soft, nontender, no hepatosplenomegaly, no masses and bowel sounds normal  MS: no gross musculoskeletal defects noted, no edema  SKIN: no suspicious lesions or rashes  NEURO: Normal strength and tone, mentation intact and speech normal  PSYCH: mentation appears normal, affect normal/bright      Signed Electronically by: KOJO West CNP    Answers submitted by the patient for this visit:  Patient Health Questionnaire (Submitted on 2/13/2024)  If you checked off any problems, how difficult have these problems made it for you to do " your work, take care of things at home, or get along with other people?: Somewhat difficult  PHQ9 TOTAL SCORE: 3

## 2024-02-14 ENCOUNTER — MYC MEDICAL ADVICE (OUTPATIENT)
Dept: FAMILY MEDICINE | Facility: CLINIC | Age: 34
End: 2024-02-14
Payer: COMMERCIAL

## 2024-02-14 ENCOUNTER — TELEPHONE (OUTPATIENT)
Dept: FAMILY MEDICINE | Facility: CLINIC | Age: 34
End: 2024-02-14
Payer: COMMERCIAL

## 2024-02-14 DIAGNOSIS — K21.00 GASTROESOPHAGEAL REFLUX DISEASE WITH ESOPHAGITIS WITHOUT HEMORRHAGE: Primary | ICD-10-CM

## 2024-02-14 DIAGNOSIS — E66.01 CLASS 3 SEVERE OBESITY DUE TO EXCESS CALORIES WITH SERIOUS COMORBIDITY AND BODY MASS INDEX (BMI) OF 40.0 TO 44.9 IN ADULT (H): ICD-10-CM

## 2024-02-14 DIAGNOSIS — E66.813 CLASS 3 SEVERE OBESITY DUE TO EXCESS CALORIES WITH SERIOUS COMORBIDITY AND BODY MASS INDEX (BMI) OF 40.0 TO 44.9 IN ADULT (H): ICD-10-CM

## 2024-02-14 LAB
ALBUMIN SERPL BCG-MCNC: 4.4 G/DL (ref 3.5–5.2)
ALP SERPL-CCNC: 91 U/L (ref 40–150)
ALT SERPL W P-5'-P-CCNC: 45 U/L (ref 0–50)
ANION GAP SERPL CALCULATED.3IONS-SCNC: 11 MMOL/L (ref 7–15)
AST SERPL W P-5'-P-CCNC: 34 U/L (ref 0–45)
BILIRUB SERPL-MCNC: 0.3 MG/DL
BUN SERPL-MCNC: 7 MG/DL (ref 6–20)
CALCIUM SERPL-MCNC: 9.4 MG/DL (ref 8.6–10)
CHLORIDE SERPL-SCNC: 103 MMOL/L (ref 98–107)
CHOLEST SERPL-MCNC: 206 MG/DL
CREAT SERPL-MCNC: 0.73 MG/DL (ref 0.51–0.95)
DEPRECATED HCO3 PLAS-SCNC: 24 MMOL/L (ref 22–29)
EGFRCR SERPLBLD CKD-EPI 2021: >90 ML/MIN/1.73M2
FASTING STATUS PATIENT QL REPORTED: ABNORMAL
FERRITIN SERPL-MCNC: 353 NG/ML (ref 6–175)
GLUCOSE SERPL-MCNC: 100 MG/DL (ref 70–99)
HDLC SERPL-MCNC: 78 MG/DL
IRON BINDING CAPACITY (ROCHE): 322 UG/DL (ref 240–430)
IRON SATN MFR SERPL: 19 % (ref 15–46)
IRON SERPL-MCNC: 61 UG/DL (ref 37–145)
LDLC SERPL CALC-MCNC: 104 MG/DL
NONHDLC SERPL-MCNC: 128 MG/DL
POTASSIUM SERPL-SCNC: 4.1 MMOL/L (ref 3.4–5.3)
PROT SERPL-MCNC: 7.7 G/DL (ref 6.4–8.3)
SODIUM SERPL-SCNC: 138 MMOL/L (ref 135–145)
TRIGL SERPL-MCNC: 122 MG/DL
TSH SERPL DL<=0.005 MIU/L-ACNC: 2.02 UIU/ML (ref 0.3–4.2)
VIT B12 SERPL-MCNC: 452 PG/ML (ref 232–1245)
VIT D+METAB SERPL-MCNC: 22 NG/ML (ref 20–50)

## 2024-02-14 NOTE — TELEPHONE ENCOUNTER
General Call    Contacts         Type Contact Phone/Fax    02/14/2024 04:51 PM CST Fax (Incoming) [id number] - [place] - [address]  AT [facility] & [facility] (Pharmacy) [phone]          Reason for Call:  PA needed    What are your questions or concerns:  Requesting PA for Omeprazole 40MG Capsules

## 2024-02-15 NOTE — TELEPHONE ENCOUNTER
Yasemin Isbell --  Please review and advise: omeprazole and Wegovy.     Omeprazole. Insurance will only approve 1 tab (40 mg) per day. Patient is willing to purchase the other tablet (1 capsule BID) OTC. Pended script below if you agree.   Weight-loss medication. Insurance will cove Mounjaro or Ozempic with physician approval. Please pick one to start the patient on.     Thank you,   SHAKA GarciaN LEROY  [facility]

## 2024-02-16 RX ORDER — OMEPRAZOLE 40 MG/1
40 CAPSULE, DELAYED RELEASE ORAL AT BEDTIME
Qty: 42 CAPSULE | Refills: 0 | Status: SHIPPED | OUTPATIENT
Start: 2024-02-16 | End: 2024-02-16

## 2024-02-16 NOTE — TELEPHONE ENCOUNTER
- PA entered for Omeprazole 40 mg BID    - Order placed Ozemipc 2 mg weekly. Please notify patient Ozempic and Mounjaro are approved for DM treatment only; insurance may deny request. PA entered.     KOJO West CNP

## 2024-02-19 ENCOUNTER — TELEPHONE (OUTPATIENT)
Dept: FAMILY MEDICINE | Facility: CLINIC | Age: 34
End: 2024-02-19
Payer: COMMERCIAL

## 2024-02-19 NOTE — TELEPHONE ENCOUNTER
Relayed approval of Omeprazole and denial of Ozempic. Ozempic denial already being forwarded to ordering provider for review.    SHAKA BrayN, RN  [facility]

## 2024-02-19 NOTE — TELEPHONE ENCOUNTER
Retail Pharmacy Prior Authorization Team   Phone: [phone]        EPA APPROVAL: PROVIDER INITIATED EPA:

## 2024-02-19 NOTE — TELEPHONE ENCOUNTER
Left message to call back and ask to speak with an available triage nurse.    SHAKA AbelN, RN-BC  [facility]

## 2024-02-19 NOTE — TELEPHONE ENCOUNTER
Retail Pharmacy Prior Authorization Team   Phone: [phone]    Provider submitted PA request, received denial letter via fax. All future correspondence and appeals must be handled by care team/provider. Thank you        Appeal information -

## 2024-02-21 NOTE — TELEPHONE ENCOUNTER
Pt was notified of Yasemin's comments  She did see her notes on mychart and she will make appt later with Yasemin Ortiz, RN, BSN  [facility]

## 2024-02-21 NOTE — TELEPHONE ENCOUNTER
Please have patient contact insurance for weight management medication coverage. Do they except appeals for Wecesarvy?  KOJO West CNP

## 2024-03-11 ENCOUNTER — VIRTUAL VISIT (OUTPATIENT)
Dept: FAMILY MEDICINE | Facility: CLINIC | Age: 34
End: 2024-03-11
Payer: COMMERCIAL

## 2024-03-11 DIAGNOSIS — K21.00 GASTROESOPHAGEAL REFLUX DISEASE WITH ESOPHAGITIS WITHOUT HEMORRHAGE: ICD-10-CM

## 2024-03-11 DIAGNOSIS — E66.01 CLASS 3 SEVERE OBESITY DUE TO EXCESS CALORIES WITH SERIOUS COMORBIDITY AND BODY MASS INDEX (BMI) OF 40.0 TO 44.9 IN ADULT (H): Primary | ICD-10-CM

## 2024-03-11 DIAGNOSIS — E66.813 CLASS 3 SEVERE OBESITY DUE TO EXCESS CALORIES WITH SERIOUS COMORBIDITY AND BODY MASS INDEX (BMI) OF 40.0 TO 44.9 IN ADULT (H): Primary | ICD-10-CM

## 2024-03-11 PROCEDURE — 99214 OFFICE O/P EST MOD 30 MIN: CPT | Mod: 95 | Performed by: NURSE PRACTITIONER

## 2024-03-11 RX ORDER — OMEPRAZOLE 40 MG/1
40 CAPSULE, DELAYED RELEASE ORAL 2 TIMES DAILY
Qty: 112 CAPSULE | Refills: 0 | Status: CANCELLED | OUTPATIENT
Start: 2024-03-11

## 2024-03-11 NOTE — PROGRESS NOTES
"Isabell is a 34 year old who is being evaluated via a billable video visit.      How would you like to obtain your AVS? MyChart  If the video visit is dropped, the invitation should be resent by: Text to cell phone: [phone]  Will anyone else be joining your video visit? No          Assessment & Plan     Class 3 severe obesity due to excess calories with serious comorbidity and body mass index (BMI) of 40.0 to 44.9 in adult (H)  GLP-1 no longer covered by insurance; effective weight/appetite management loss of ~40 lbs (263->226 lbs) since stopping medication has noticed increase cravings, thinking about food  Estimated body mass index is 36.48 kg/m  as calculated from the following:    Height as of 2/13/24: 1.676 m (5' 6\").    Weight as of 2/13/24: 102.5 kg (226 lb).   Discussed other treatment options w/phentermine, topiramate, natrexone, wellbutrin reviewed side effects of all medications; discussed and lifestyle changes including starting an exercise/activity program 30 minutes daily, daily caloric/cho/protein; meal tracking;    - reduce caloric intake 1800-2K per day; cho 100-150 gms per meal; protein 25-30 gm per meal  - phentermine 15 MG capsule  Dispense: 60 capsule; Refill: 0  - topiramate (TOPAMAX) 25 MG tablet  Dispense: 270 tablet; Refill: 0    Gastroesophageal reflux disease with esophagitis without hemorrhage  - omeprazole (PRILOSEC) 20 MG DR capsule  Dispense: 180 capsule; Refill: 1              BMI  Estimated body mass index is 36.48 kg/m  as calculated from the following:    Height as of 2/13/24: 1.676 m (5' 6\").    Weight as of 2/13/24: 102.5 kg (226 lb).             Subjective   Isabell is a 34 year old, presenting for the following health issues:  Weight Loss      3/11/2024     4:45 PM   Additional Questions   Roomed by Sharon GEORGE     History of Present Illness       Reason for visit:  Weight management    She eats 4 or more servings of fruits and vegetables daily.She consumes 0 sweetened " beverage(s) daily.She exercises with enough effort to increase her heart rate 20 to 29 minutes per day.  She exercises with enough effort to increase her heart rate 4 days per week.   She is taking medications regularly.                   Objective           Vitals:  No vitals were obtained today due to virtual visit.    Physical Exam   GENERAL: alert and no distress  EYES: Eyes grossly normal to inspection.  No discharge or erythema, or obvious scleral/conjunctival abnormalities.  RESP: No audible wheeze, cough, or visible cyanosis.    SKIN: Visible skin clear. No significant rash, abnormal pigmentation or lesions.  NEURO: Cranial nerves grossly intact.  Mentation and speech appropriate for age.  PSYCH: Appropriate affect, tone, and pace of words          Video-Visit Details    Type of service:  Video Visit     Originating Location (pt. Location): Home    Distant Location (provider location):  On-site  Platform used for Video Visit: Rohit  Signed Electronically by: KOJO West CNP

## 2024-03-13 ENCOUNTER — MYC MEDICAL ADVICE (OUTPATIENT)
Dept: FAMILY MEDICINE | Facility: CLINIC | Age: 34
End: 2024-03-13
Payer: COMMERCIAL

## 2024-03-13 NOTE — TELEPHONE ENCOUNTER
Yasemin Isbell --    Please review and advise: Phentermine and Topiramate.    Virtual visit: 3/11/2024.     Message from patient from Qool:   I had discussed new prescriptions at my telehealth appointment this week. I m just following up as I don t see that they have been sent over to my pharmacy. The medications were phentermine and topiramate.     SHAKA GarciaN LEROY  [facility]

## 2024-03-15 ENCOUNTER — TELEPHONE (OUTPATIENT)
Dept: FAMILY MEDICINE | Facility: CLINIC | Age: 34
End: 2024-03-15
Payer: COMMERCIAL

## 2024-03-15 RX ORDER — PHENTERMINE HYDROCHLORIDE 15 MG/1
15 CAPSULE ORAL EVERY MORNING
Qty: 60 CAPSULE | Refills: 0 | Status: SHIPPED | OUTPATIENT
Start: 2024-03-15 | End: 2024-04-22

## 2024-03-15 RX ORDER — TOPIRAMATE 25 MG/1
TABLET, FILM COATED ORAL
Qty: 270 TABLET | Refills: 0 | Status: SHIPPED | OUTPATIENT
Start: 2024-03-15 | End: 2024-04-22

## 2024-03-15 NOTE — TELEPHONE ENCOUNTER
Patient is calling wanting to know what the status of these medications are. Please advise. Pt is wanting to start these medications asap

## 2024-03-15 NOTE — TELEPHONE ENCOUNTER
Medication Question or Refill    Contacts         Type Contact Phone/Fax    03/15/2024 11:53 AM CDT Phone (Incoming) Isabell Maza (Self) [phone] (M)            What medication are you calling about (include dose and sig)?: omeprazole (PRILOSEC) 20 MG DR capsule two times daily     Preferred Pharmacy:  [id number] - [place], [place] - [address]  [facility] & [address] [place] [address]  Phone: [phone] Fax: [phone]      Controlled Substance Agreement on file:   CSA -- Patient Level:    CSA: None found at the patient level.       Who prescribed the medication?: PCP    Do you need a refill? Yes    When did you use the medication last? As prescribed    Patient offered an appointment? No    Do you have any questions or concerns?  Yes: Pt stated this was suppose to be sent to pharmacy with lower dose      Could we send this information to you in [email or web address] or would you prefer to receive a phone call?:   Patient would like to be contacted via Swapsee

## 2024-03-28 NOTE — TELEPHONE ENCOUNTER
Prior Authorization Retail Medication Request    Medication/Dose: phentermine 15 MG capsule   Diagnosis and ICD code (if different than what is on RX):  Class 3 severe obesity due to excess calories with serious comorbidity and body mass index (BMI) of 40.0 to 44.9 in adult (H) [E66.01, Z68.41]  - Primary     Insurance   Primary: [facility]   Insurance ID:  [id number]     Pharmacy Information (if different than what is on RX)  Name:  [facility]  Phone:  [phone]   Fax: [phone]    
Retail Pharmacy Prior Authorization Team   Phone: [phone]    PA Initiation    Medication: PHENTERMINE HCL 15 MG PO CAPS  Insurance Company: [employer] [place] - Phone [phone] Fax [phone]  Pharmacy Filling the Rx: [id number] - [place], [place] - [address]  AT [facility] & [facility]  Filling Pharmacy Phone: [phone]  Filling Pharmacy Fax:    Start Date: 3/27/2024      Note: Due to record-high volumes, our turn-around time is taking longer than usual . We are currently 10 business days behind in the pools.   We are working diligently to submit all requests in a timely manner and in the order they are received. Please only flag TRUE URGENT requests as high priority to the pool at this time.   If you have questions - please send a note/message in the active PA encounter and send back to the RPPA (Retail Pharmacy Prior Authorization) team [[id number]].    If you have more specific questions about our process please reach out to our supervisor Michell Dumont.   Thank you!     
Retail Pharmacy Prior Authorization Team   Phone: [phone]    PRIOR AUTHORIZATION DENIED    Medication: PHENTERMINE HCL 15 MG PO CAPS  Insurance Company: [employer] [place] - Phone [phone] Fax [phone]  Denial Date: 3/27/2024  Denial Reason(s): WEIGHT LOSS MEDICATIONS ARE NOT A COVERED BENEFIT    Appeal Information:         
Retail Pharmacy Prior Authorization Team   Phone: [phone]    PA DENIED  If the provider would like to appeal we will need a detailed   letter of medical necessity with clinical reasoning to start the process.   Please close the encounter when finished.   Thank you,  Isabell Cassidy CPhT    PA Team    
Writer called and left message on patient's voicemail to call back and speak with a triage nurse.    Writer responded via Trooval.    Conchita Germain, SHAKAN RN  [facility]          
IPOC
Neuropsychology
Nutrition Services

## 2024-04-12 ENCOUNTER — PATIENT OUTREACH (OUTPATIENT)
Dept: OBGYN | Facility: CLINIC | Age: 34
End: 2024-04-12
Payer: COMMERCIAL

## 2024-04-12 DIAGNOSIS — R87.810 CERVICAL HIGH RISK HPV (HUMAN PAPILLOMAVIRUS) TEST POSITIVE: Primary | ICD-10-CM

## 2024-04-22 ENCOUNTER — VIRTUAL VISIT (OUTPATIENT)
Dept: FAMILY MEDICINE | Facility: CLINIC | Age: 34
End: 2024-04-22
Payer: COMMERCIAL

## 2024-04-22 DIAGNOSIS — K44.9 HIATAL HERNIA WITH GASTROESOPHAGEAL REFLUX DISEASE WITHOUT ESOPHAGITIS: ICD-10-CM

## 2024-04-22 DIAGNOSIS — E66.813 CLASS 3 SEVERE OBESITY DUE TO EXCESS CALORIES WITH SERIOUS COMORBIDITY AND BODY MASS INDEX (BMI) OF 40.0 TO 44.9 IN ADULT (H): Primary | ICD-10-CM

## 2024-04-22 DIAGNOSIS — K21.00 GASTROESOPHAGEAL REFLUX DISEASE WITH ESOPHAGITIS WITHOUT HEMORRHAGE: ICD-10-CM

## 2024-04-22 DIAGNOSIS — Z98.84 BARIATRIC SURGERY STATUS: ICD-10-CM

## 2024-04-22 DIAGNOSIS — K21.9 HIATAL HERNIA WITH GASTROESOPHAGEAL REFLUX DISEASE WITHOUT ESOPHAGITIS: ICD-10-CM

## 2024-04-22 DIAGNOSIS — E66.01 CLASS 3 SEVERE OBESITY DUE TO EXCESS CALORIES WITH SERIOUS COMORBIDITY AND BODY MASS INDEX (BMI) OF 40.0 TO 44.9 IN ADULT (H): Primary | ICD-10-CM

## 2024-04-22 PROCEDURE — 99214 OFFICE O/P EST MOD 30 MIN: CPT | Mod: 95 | Performed by: NURSE PRACTITIONER

## 2024-04-22 RX ORDER — TOPIRAMATE 25 MG/1
TABLET, FILM COATED ORAL
Qty: 270 TABLET | Refills: 1 | Status: SHIPPED | OUTPATIENT
Start: 2024-04-22 | End: 2024-08-12

## 2024-04-22 RX ORDER — PHENTERMINE HYDROCHLORIDE 15 MG/1
15 CAPSULE ORAL EVERY MORNING
Qty: 90 CAPSULE | Refills: 0 | Status: SHIPPED | OUTPATIENT
Start: 2024-04-22 | End: 2024-08-12

## 2024-04-22 NOTE — PROGRESS NOTES
"Isabell is a 34 year old who is being evaluated via a billable video visit.    How would you like to obtain your AVS? MyChart  If the video visit is dropped, the invitation should be resent by: Text to cell phone: [phone]  Will anyone else be joining your video visit? No      Assessment & Plan   Joined the call at 4/22/2024, 8:33:36 am.  Left the call at 4/22/2024, 8:52:27 am.  You were on the call for 18 minutes 51 seconds .    Gastroesophageal reflux disease with esophagitis without hemorrhage  Hiatal hernia with gastroesophageal reflux disease without esophagitis   Insurance coverage issues with 20 mg BID; tolerating well; will request PA 20 mg BID; if denied trial 40 mg daily   - omeprazole (PRILOSEC) 20 MG DR capsule  Dispense: 180 capsule;     Class 3 severe obesity due to excess calories with serious comorbidity and body mass index (BMI) of 40.0 to 44.9 in adult (H)  Bariatric surgery status   Wt Readings from Last 5 Encounters:   02/13/24 102.5 kg (226 lb)   04/27/23 117.5 kg (259 lb)   02/01/23 119.3 kg (263 lb)   04/26/22 115.8 kg (255 lb 4 oz)   04/14/22 114.5 kg (252 lb 8 oz)     Estimated body mass index is 36.48 kg/m  as calculated from the following:    Height as of 2/13/24: 1.676 m (5' 6\").    Weight as of 2/13/24: 102.5 kg (226 lb).  - continue lifestyle changes: exercise/activity program 30 minutes daily,   - daily monitoring of caloric/cho/protein  - reduce caloric intake 1600 per day; cho 100-150 gms per day; protein 25-30 gm per meal; no snacking  - follow up in 3 months in person   Renewed:  - phentermine 15 MG capsule  Dispense: 90 capsule; Refill: 0  - topiramate (TOPAMAX) 25 MG tablet  Dispense: 270 tablet; Refill: 1  - discussed compounded glp-1 therapy via  pharmacy             BMI  Estimated body mass index is 36.48 kg/m  as calculated from the following:    Height as of 2/13/24: 1.676 m (5' 6\").    Weight as of 2/13/24: 102.5 kg (226 lb).             Subjective   Isabell is a 34 " "year old, presenting for the following health issues:  Follow Up and Formulary Issue (Re-write Rx for Omeprazole )        4/22/2024     7:58 AM   Additional Questions   Roomed by Christelle     History of Present Illness       Reason for visit:  Weight management - follow up on new prescriptions.    She eats 4 or more servings of fruits and vegetables daily.She consumes 0 sweetened beverage(s) daily.She exercises with enough effort to increase her heart rate 20 to 29 minutes per day.  She exercises with enough effort to increase her heart rate 4 days per week.   She is taking medications regularly.    Follow up weight management last visit 3/11/24 changed to phentermine/topiramate due to insurance denial GLP-1 therapy continuation      Class 3 severe obesity due to excess calories with serious comorbidity and body mass index (BMI) of 40.0 to 44.9 in adult (H)  GLP-1 no longer covered by insurance; effective weight/appetite management loss of ~40 lbs (263->226 lbs) since stopping medication has noticed increase cravings, thinking about food  Estimated body mass index is 36.48 kg/m  as calculated from the following:    Height as of 2/13/24: 1.676 m (5' 6\").    Weight as of 2/13/24: 102.5 kg (226 lb).   Discussed other treatment options w/phentermine, topiramate, natrexone, wellbutrin reviewed side effects of all medications; discussed and lifestyle changes including starting an exercise/activity program 30 minutes daily, daily caloric/cho/protein; meal tracking;    - reduce caloric intake 1800-2K per day; cho 100-150 gms per meal; protein 25-30 gm per meal  - phentermine 15 MG capsule  Dispense: 60 capsule; Refill: 0  - topiramate (TOPAMAX) 25 MG tablet  Dispense: 270 tablet; Refill: 0     Gastroesophageal reflux disease with esophagitis without hemorrhage  - omeprazole (PRILOSEC) 20 MG DR capsule  Dispense: 180 capsule;     Today:   WT: 225 lb  - Phentermine has been effective reducing appetite; feeling full; stopping food " thoughts; no side effects  Topiramate 25/50 mg tolerating well; reduced cravings; does notice change in taste; carbonated drinks are flat              Objective    Vitals - Patient Reported  Weight (Patient Reported): 102.1 kg (225 lb)      Vitals:  No vitals were obtained today due to virtual visit.    Physical Exam   GENERAL: alert and no distress  EYES: Eyes grossly normal to inspection.  No discharge or erythema, or obvious scleral/conjunctival abnormalities.  RESP: No audible wheeze, cough, or visible cyanosis.    SKIN: Visible skin clear. No significant rash, abnormal pigmentation or lesions.  NEURO: Cranial nerves grossly intact.  Mentation and speech appropriate for age.  PSYCH: Appropriate affect, tone, and pace of words          Video-Visit Details    Type of service:  Video Visit   Joined the call at 4/22/2024, 8:33:36 am.  Left the call at 4/22/2024, 8:52:27 am.  You were on the call for 18 minutes 51 seconds .  Originating Location (pt. Location): Home    Distant Location (provider location):  On-site  Platform used for Video Visit: Rohit  Signed Electronically by: KOJO West CNP

## 2024-05-15 DIAGNOSIS — K21.00 GASTROESOPHAGEAL REFLUX DISEASE WITH ESOPHAGITIS WITHOUT HEMORRHAGE: ICD-10-CM

## 2024-05-17 ENCOUNTER — TELEPHONE (OUTPATIENT)
Dept: FAMILY MEDICINE | Facility: CLINIC | Age: 34
End: 2024-05-17
Payer: COMMERCIAL

## 2024-05-21 NOTE — TELEPHONE ENCOUNTER
Prior Authorization Retail Medication Request    Medication/Dose: PA requesting Wegovy. Key:[id number].   Diagnosis and ICD code (if different than what is on RX):  Class 3 severe obesity due to excess calories with serious comorbidity and body mass index (BMI) of 40.0 to 44.9 in adult (H) [E66.01, Z68.41]     Insurance   Primary:     [facility]     Insurance ID:  [id number]     Pharmacy Information (if different than what is on RX)  Name:    [id number] - [place], [place] - [address]  AT [address] [facility] [address] [facility] [address]     Phone:  [phone]   Fax:[phone]

## 2024-05-30 NOTE — TELEPHONE ENCOUNTER
PA for this medication was submitted and denied in encounter dated 01/29/2024. If you would like to appeal please provide a letter of medical necessity with clinical reason and route the original encounter back to the PA team. Thank you.

## 2024-06-28 NOTE — TELEPHONE ENCOUNTER
FYI to provider - Patient is lost to pap tracking follow-up. Attempts to contact pt have been made per reminder process and there has been no reply and/or no appt scheduled. Contact hx listed below.     4/27/23 NIL pap, +HR HPV, not 16/18. Plan 1 yr co-test  5/4/23 Left message and MyChart letter / read by pt  4/12/24 Reminder MyChart  5/20/24 Visit for pap  6/7/24 Reminder call -- left message  6/28/24 Lost to follow-up for pap tracking

## 2024-08-11 ASSESSMENT — PATIENT HEALTH QUESTIONNAIRE - PHQ9
10. IF YOU CHECKED OFF ANY PROBLEMS, HOW DIFFICULT HAVE THESE PROBLEMS MADE IT FOR YOU TO DO YOUR WORK, TAKE CARE OF THINGS AT HOME, OR GET ALONG WITH OTHER PEOPLE: SOMEWHAT DIFFICULT
SUM OF ALL RESPONSES TO PHQ QUESTIONS 1-9: 2
SUM OF ALL RESPONSES TO PHQ QUESTIONS 1-9: 2

## 2024-08-12 ENCOUNTER — VIRTUAL VISIT (OUTPATIENT)
Dept: FAMILY MEDICINE | Facility: CLINIC | Age: 34
End: 2024-08-12
Payer: COMMERCIAL

## 2024-08-12 DIAGNOSIS — E66.01 CLASS 3 SEVERE OBESITY DUE TO EXCESS CALORIES WITH SERIOUS COMORBIDITY AND BODY MASS INDEX (BMI) OF 40.0 TO 44.9 IN ADULT (H): ICD-10-CM

## 2024-08-12 DIAGNOSIS — E66.813 CLASS 3 SEVERE OBESITY DUE TO EXCESS CALORIES WITH SERIOUS COMORBIDITY AND BODY MASS INDEX (BMI) OF 40.0 TO 44.9 IN ADULT (H): ICD-10-CM

## 2024-08-12 PROCEDURE — 99213 OFFICE O/P EST LOW 20 MIN: CPT | Mod: 95 | Performed by: NURSE PRACTITIONER

## 2024-08-12 PROCEDURE — G2211 COMPLEX E/M VISIT ADD ON: HCPCS | Mod: 95 | Performed by: NURSE PRACTITIONER

## 2024-08-12 RX ORDER — PHENTERMINE HYDROCHLORIDE 15 MG/1
15 CAPSULE ORAL EVERY MORNING
Qty: 90 CAPSULE | Refills: 2 | Status: SHIPPED | OUTPATIENT
Start: 2024-08-12

## 2024-08-12 NOTE — PROGRESS NOTES
"Isabell is a 34 year old who is being evaluated via a billable video visit.    How would you like to obtain your AVS? MyChart  If the video visit is dropped, the invitation should be resent by: Text to cell phone: [phone]  Will anyone else be joining your video visit? No      Assessment & Plan     Class 3 severe obesity due to excess calories with serious comorbidity and body mass index (BMI) of 40.0 to 44.9 in adult (H)  Maintaining 40lbs weight loss with phentermine.  We can consider MTM to discuss options for compounded semaglutide as no longer covered by insurance.  Refills provided, return to clinic in February for preventative exam.  - phentermine 15 MG capsule; Take 1 capsule (15 mg) by mouth every morning      The longitudinal plan of care for the diagnosis(es)/condition(s) as documented were addressed during this visit. Due to the added complexity in care, I will continue to support Isabell in the subsequent management and with ongoing continuity of care.    BMI  Estimated body mass index is 36.48 kg/m  as calculated from the following:    Height as of 2/13/24: 1.676 m (5' 6\").    Weight as of 2/13/24: 102.5 kg (226 lb).   Weight management plan: Discussed healthy diet and exercise guidelines          Subjective   Isabell is a 34 year old, presenting for the following health issues:  Establish Care, Refill Request, and Weight management      8/12/2024     8:14 AM   Additional Questions   Roomed by Reena ALVARADO     History of Present Illness       Reason for visit:  Weight management; medication refill    She eats 4 or more servings of fruits and vegetables daily.She consumes 1 sweetened beverage(s) daily.She exercises with enough effort to increase her heart rate 30 to 60 minutes per day.  She exercises with enough effort to increase her heart rate 3 or less days per week.   She is taking medications regularly.       Stopped the topamax, felt weird taste  Phentermine not working as well, keeping maintained. " "   Was on wegovy. Lost 40lbs.  Works with psychiatry for management Wellbutrin                  Objective    Vitals - Patient Reported  Weight (Patient Reported): 106.6 kg (235 lb)  Height (Patient Reported): 170.2 cm (5' 7\")  BMI (Based on Pt Reported Ht/Wt): 36.81  Pain Score: No Pain (0)        Physical Exam   GENERAL: alert and no distress  EYES: Eyes grossly normal to inspection.  No discharge or erythema, or obvious scleral/conjunctival abnormalities.  RESP: No audible wheeze, cough, or visible cyanosis.    SKIN: Visible skin clear. No significant rash, abnormal pigmentation or lesions.  NEURO: Cranial nerves grossly intact.  Mentation and speech appropriate for age.  PSYCH: Appropriate affect, tone, and pace of words          Video-Visit Details    Type of service:  Video Visit   Originating Location (pt. Location): Home    Distant Location (provider location):  Off-site  Platform used for Video Visit: Rohit  Signed Electronically by: Sheyla Reynolds DNP    "

## 2025-01-14 ENCOUNTER — PATIENT OUTREACH (OUTPATIENT)
Dept: CARE COORDINATION | Facility: CLINIC | Age: 35
End: 2025-01-14
Payer: COMMERCIAL

## 2025-01-28 ENCOUNTER — PATIENT OUTREACH (OUTPATIENT)
Dept: CARE COORDINATION | Facility: CLINIC | Age: 35
End: 2025-01-28
Payer: COMMERCIAL

## 2025-02-22 ENCOUNTER — ANCILLARY PROCEDURE (OUTPATIENT)
Dept: GENERAL RADIOLOGY | Facility: CLINIC | Age: 35
End: 2025-02-22
Attending: NURSE PRACTITIONER
Payer: COMMERCIAL

## 2025-02-22 ENCOUNTER — OFFICE VISIT (OUTPATIENT)
Dept: URGENT CARE | Facility: URGENT CARE | Age: 35
End: 2025-02-22
Payer: COMMERCIAL

## 2025-02-22 VITALS
HEART RATE: 90 BPM | RESPIRATION RATE: 16 BRPM | DIASTOLIC BLOOD PRESSURE: 91 MMHG | SYSTOLIC BLOOD PRESSURE: 140 MMHG | TEMPERATURE: 97.3 F | BODY MASS INDEX: 40.42 KG/M2 | WEIGHT: 250.4 LBS | OXYGEN SATURATION: 98 %

## 2025-02-22 DIAGNOSIS — R10.9 LEFT FLANK PAIN: ICD-10-CM

## 2025-02-22 DIAGNOSIS — R30.0 DYSURIA: Primary | ICD-10-CM

## 2025-02-22 DIAGNOSIS — K59.00 CONSTIPATION, UNSPECIFIED CONSTIPATION TYPE: ICD-10-CM

## 2025-02-22 LAB
ALBUMIN UR-MCNC: NEGATIVE MG/DL
APPEARANCE UR: CLEAR
BILIRUB UR QL STRIP: NEGATIVE
CLUE CELLS: ABNORMAL
COLOR UR AUTO: YELLOW
GLUCOSE UR STRIP-MCNC: NEGATIVE MG/DL
HGB UR QL STRIP: ABNORMAL
KETONES UR STRIP-MCNC: NEGATIVE MG/DL
LEUKOCYTE ESTERASE UR QL STRIP: NEGATIVE
NITRATE UR QL: NEGATIVE
PH UR STRIP: 5.5 [PH] (ref 5–7)
RBC #/AREA URNS AUTO: ABNORMAL /HPF
SP GR UR STRIP: >=1.03 (ref 1–1.03)
SQUAMOUS #/AREA URNS AUTO: ABNORMAL /LPF
TRICHOMONAS, WET PREP: ABNORMAL
UROBILINOGEN UR STRIP-ACNC: 0.2 E.U./DL
WBC #/AREA URNS AUTO: ABNORMAL /HPF
WBC'S/HIGH POWER FIELD, WET PREP: ABNORMAL
YEAST, WET PREP: ABNORMAL

## 2025-02-22 PROCEDURE — 87210 SMEAR WET MOUNT SALINE/INK: CPT | Performed by: NURSE PRACTITIONER

## 2025-02-22 PROCEDURE — 99213 OFFICE O/P EST LOW 20 MIN: CPT | Performed by: NURSE PRACTITIONER

## 2025-02-22 PROCEDURE — 74019 RADEX ABDOMEN 2 VIEWS: CPT | Mod: TC | Performed by: RADIOLOGY

## 2025-02-22 PROCEDURE — 87086 URINE CULTURE/COLONY COUNT: CPT | Performed by: NURSE PRACTITIONER

## 2025-02-22 PROCEDURE — 81001 URINALYSIS AUTO W/SCOPE: CPT | Performed by: NURSE PRACTITIONER

## 2025-02-22 NOTE — PROGRESS NOTES
Assessment & Plan   Problem List Items Addressed This Visit    None  Visit Diagnoses       Dysuria    -  Primary    Relevant Orders    UA Macroscopic with reflex to Microscopic and Culture - Clinic Collect (Completed)    Wet prep - Clinic Collect (Completed)    UA Microscopic with Reflex to Culture (Completed)    Left flank pain        Relevant Orders    XR Abdomen 2 Views    Urine Culture Aerobic Bacterial - lab collect           Patient reports menstruation started today , Will obtain urine culture and treat accordingly.  Recommend OTC pain medication for back discomfort.  Also recommend high-fiber diet adequate water intake for constipation.  If symptoms persist could consider CT abdomen pelvis at that time           No follow-ups on file.      Wilbert Whyte is a 35 year old, presenting for the following health issues:  UTI (34 yo F presents with the following complaint pain lower left back side and pelvis area, no pain urinating some cloudyness of urine today left side flank pain is intense  pain is constant worsening  tx-  ibuprofen last dose 4 hrs ago LMP is due today Pt. Does have an IUD  )        8/12/2024     8:14 AM   Additional Questions   Roomed by Reena LIMA               Review of Systems  Constitutional, HEENT, cardiovascular, pulmonary, GI, , musculoskeletal, neuro, skin, endocrine and psych systems are negative, except as otherwise noted.      Objective    BP (!) 140/91   Pulse 90   Temp 97.3  F (36.3  C)   Resp 16   Wt 113.6 kg (250 lb 6.4 oz)   SpO2 98%   BMI 40.42 kg/m    Body mass index is 40.42 kg/m .  Physical Exam   GENERAL: alert and no distress  EYES: Eyes grossly normal to inspection,conjunctivae and sclerae normal  RESP: Respirations regular nonlabored  ABDOMEN: soft, obese nontender, no hepatosplenomegaly, no masses and bowel sounds normal no CVA tenderness  MS: no gross musculoskeletal defects noted, no edema  PSYCH: mentation appears normal, affect  normal/bright    Recent Results (from the past 240 hours)   UA Macroscopic with reflex to Microscopic and Culture - Clinic Collect    Collection Time: 02/22/25  4:55 PM    Specimen: Urine, Clean Catch   Result Value Ref Range    Color Urine Yellow Colorless, Straw, Light Yellow, Yellow    Appearance Urine Clear Clear    Glucose Urine Negative Negative mg/dL    Bilirubin Urine Negative Negative    Ketones Urine Negative Negative mg/dL    Specific Gravity Urine >=1.030 1.003 - 1.035    Blood Urine Moderate (A) Negative    pH Urine 5.5 5.0 - 7.0    Protein Albumin Urine Negative Negative mg/dL    Urobilinogen Urine 0.2 0.2, 1.0 E.U./dL    Nitrite Urine Negative Negative    Leukocyte Esterase Urine Negative Negative   Wet prep - Clinic Collect    Collection Time: 02/22/25  4:55 PM    Specimen: Vagina; Swab   Result Value Ref Range    Trichomonas Absent Absent    Yeast Absent Absent    Clue Cells Absent Absent    WBCs/high power field 2+ (A) None   UA Microscopic with Reflex to Culture    Collection Time: 02/22/25  4:55 PM   Result Value Ref Range    RBC Urine 0-2 0-2 /HPF /HPF    WBC Urine 0-5 0-5 /HPF /HPF    Squamous Epithelials Urine Few (A) None Seen /LPF             Signed Electronically by: Aneta Richards NP

## 2025-02-24 LAB — BACTERIA UR CULT: NORMAL

## 2025-04-10 ENCOUNTER — PATIENT OUTREACH (OUTPATIENT)
Dept: FAMILY MEDICINE | Facility: CLINIC | Age: 35
End: 2025-04-10
Payer: COMMERCIAL

## 2025-04-10 PROBLEM — R87.810 CERVICAL HIGH RISK HPV (HUMAN PAPILLOMAVIRUS) TEST POSITIVE: Status: ACTIVE | Noted: 2023-04-27

## 2025-06-05 ENCOUNTER — OFFICE VISIT (OUTPATIENT)
Dept: OBGYN | Facility: CLINIC | Age: 35
End: 2025-06-05
Payer: COMMERCIAL

## 2025-06-05 VITALS
HEIGHT: 67 IN | BODY MASS INDEX: 38.92 KG/M2 | HEART RATE: 111 BPM | SYSTOLIC BLOOD PRESSURE: 112 MMHG | OXYGEN SATURATION: 98 % | DIASTOLIC BLOOD PRESSURE: 78 MMHG | WEIGHT: 248 LBS

## 2025-06-05 DIAGNOSIS — Z30.432 ENCOUNTER FOR REMOVAL OF INTRAUTERINE CONTRACEPTIVE DEVICE: Primary | ICD-10-CM

## 2025-09-01 ENCOUNTER — OFFICE VISIT (OUTPATIENT)
Dept: URGENT CARE | Facility: URGENT CARE | Age: 35
End: 2025-09-01
Payer: COMMERCIAL

## 2025-09-01 ENCOUNTER — ANCILLARY PROCEDURE (OUTPATIENT)
Dept: GENERAL RADIOLOGY | Facility: CLINIC | Age: 35
End: 2025-09-01
Attending: PHYSICIAN ASSISTANT
Payer: COMMERCIAL

## 2025-09-01 VITALS
RESPIRATION RATE: 16 BRPM | DIASTOLIC BLOOD PRESSURE: 81 MMHG | WEIGHT: 243 LBS | TEMPERATURE: 98.4 F | HEART RATE: 99 BPM | BODY MASS INDEX: 38.14 KG/M2 | OXYGEN SATURATION: 96 % | HEIGHT: 67 IN | SYSTOLIC BLOOD PRESSURE: 112 MMHG

## 2025-09-01 DIAGNOSIS — R05.1 ACUTE COUGH: ICD-10-CM

## 2025-09-01 DIAGNOSIS — R05.1 ACUTE COUGH: Primary | ICD-10-CM

## 2025-09-01 PROCEDURE — 71046 X-RAY EXAM CHEST 2 VIEWS: CPT | Mod: TC | Performed by: RADIOLOGY

## 2025-09-01 RX ORDER — BENZONATATE 200 MG/1
200 CAPSULE ORAL 3 TIMES DAILY PRN
Qty: 21 CAPSULE | Refills: 0 | Status: SHIPPED | OUTPATIENT
Start: 2025-09-01

## 2025-09-01 RX ORDER — AZITHROMYCIN 250 MG/1
TABLET, FILM COATED ORAL
Qty: 6 TABLET | Refills: 0 | Status: SHIPPED | OUTPATIENT
Start: 2025-09-01